# Patient Record
Sex: MALE | Race: WHITE | NOT HISPANIC OR LATINO | Employment: UNEMPLOYED | URBAN - METROPOLITAN AREA
[De-identification: names, ages, dates, MRNs, and addresses within clinical notes are randomized per-mention and may not be internally consistent; named-entity substitution may affect disease eponyms.]

---

## 2018-07-16 ENCOUNTER — OFFICE VISIT CONVERTED (OUTPATIENT)
Dept: NEUROSURGERY | Facility: CLINIC | Age: 38
End: 2018-07-16
Attending: PHYSICIAN ASSISTANT

## 2018-11-13 ENCOUNTER — OFFICE VISIT CONVERTED (OUTPATIENT)
Dept: NEUROSURGERY | Facility: CLINIC | Age: 38
End: 2018-11-13
Attending: PHYSICIAN ASSISTANT

## 2018-11-13 ENCOUNTER — CONVERSION ENCOUNTER (OUTPATIENT)
Dept: NEUROLOGY | Facility: CLINIC | Age: 38
End: 2018-11-13

## 2020-10-27 ENCOUNTER — HOSPITAL ENCOUNTER (OUTPATIENT)
Dept: URGENT CARE | Facility: CLINIC | Age: 40
Discharge: HOME | End: 2020-10-27
Attending: EMERGENCY MEDICINE

## 2020-11-11 ENCOUNTER — CONVERSION ENCOUNTER (OUTPATIENT)
Dept: FAMILY MEDICINE CLINIC | Facility: CLINIC | Age: 40
End: 2020-11-11

## 2020-11-11 ENCOUNTER — HOSPITAL ENCOUNTER (OUTPATIENT)
Dept: FAMILY MEDICINE CLINIC | Facility: CLINIC | Age: 40
Discharge: HOME | End: 2020-11-11
Attending: NURSE PRACTITIONER

## 2020-11-11 ENCOUNTER — OFFICE VISIT CONVERTED (OUTPATIENT)
Dept: FAMILY MEDICINE CLINIC | Facility: CLINIC | Age: 40
End: 2020-11-11
Attending: NURSE PRACTITIONER

## 2020-11-11 LAB
ALBUMIN SERPL-MCNC: 4.4 G/DL (ref 3.5–5)
ALBUMIN/GLOB SERPL: 1.3 {RATIO} (ref 1.4–2.6)
ALP SERPL-CCNC: 103 U/L (ref 53–128)
ALT SERPL-CCNC: 15 U/L (ref 10–40)
ANION GAP SERPL CALC-SCNC: 15 MMOL/L (ref 8–19)
AST SERPL-CCNC: 20 U/L (ref 15–50)
BASOPHILS # BLD AUTO: 0.07 10*3/UL (ref 0–0.2)
BASOPHILS NFR BLD AUTO: 0.5 % (ref 0–3)
BILIRUB SERPL-MCNC: 0.48 MG/DL (ref 0.2–1.3)
BUN SERPL-MCNC: 17 MG/DL (ref 5–25)
BUN/CREAT SERPL: 15 {RATIO} (ref 6–20)
CALCIUM SERPL-MCNC: 9.6 MG/DL (ref 8.7–10.4)
CHLORIDE SERPL-SCNC: 99 MMOL/L (ref 99–111)
CHOLEST SERPL-MCNC: 142 MG/DL (ref 107–200)
CHOLEST/HDLC SERPL: 3.5 {RATIO} (ref 3–6)
CONV ABS IMM GRAN: 0.08 10*3/UL (ref 0–0.2)
CONV CO2: 27 MMOL/L (ref 22–32)
CONV IMMATURE GRAN: 0.6 % (ref 0–1.8)
CONV TOTAL PROTEIN: 7.7 G/DL (ref 6.3–8.2)
CREAT UR-MCNC: 1.1 MG/DL (ref 0.7–1.2)
DEPRECATED RDW RBC AUTO: 40.8 FL (ref 35.1–43.9)
EOSINOPHIL # BLD AUTO: 0.23 10*3/UL (ref 0–0.7)
EOSINOPHIL # BLD AUTO: 1.8 % (ref 0–7)
ERYTHROCYTE [DISTWIDTH] IN BLOOD BY AUTOMATED COUNT: 11.9 % (ref 11.6–14.4)
GFR SERPLBLD BASED ON 1.73 SQ M-ARVRAT: >60 ML/MIN/{1.73_M2}
GLOBULIN UR ELPH-MCNC: 3.3 G/DL (ref 2–3.5)
GLUCOSE SERPL-MCNC: 89 MG/DL (ref 70–99)
HCT VFR BLD AUTO: 48.5 % (ref 42–52)
HDLC SERPL-MCNC: 41 MG/DL (ref 40–60)
HGB BLD-MCNC: 16.1 G/DL (ref 14–18)
LDLC SERPL CALC-MCNC: 81 MG/DL (ref 70–100)
LYMPHOCYTES # BLD AUTO: 2.79 10*3/UL (ref 1–5)
LYMPHOCYTES NFR BLD AUTO: 21.3 % (ref 20–45)
MCH RBC QN AUTO: 31 PG (ref 27–31)
MCHC RBC AUTO-ENTMCNC: 33.2 G/DL (ref 33–37)
MCV RBC AUTO: 93.3 FL (ref 80–96)
MONOCYTES # BLD AUTO: 0.92 10*3/UL (ref 0.2–1.2)
MONOCYTES NFR BLD AUTO: 7 % (ref 3–10)
NEUTROPHILS # BLD AUTO: 8.99 10*3/UL (ref 2–8)
NEUTROPHILS NFR BLD AUTO: 68.8 % (ref 30–85)
NRBC CBCN: 0 % (ref 0–0.7)
OSMOLALITY SERPL CALC.SUM OF ELEC: 283 MOSM/KG (ref 273–304)
PLATELET # BLD AUTO: 336 10*3/UL (ref 130–400)
PMV BLD AUTO: 9.2 FL (ref 9.4–12.4)
POTASSIUM SERPL-SCNC: 4.9 MMOL/L (ref 3.5–5.3)
RBC # BLD AUTO: 5.2 10*6/UL (ref 4.7–6.1)
SODIUM SERPL-SCNC: 136 MMOL/L (ref 135–147)
TRIGL SERPL-MCNC: 99 MG/DL (ref 40–150)
TSH SERPL-ACNC: 5.82 M[IU]/L (ref 0.27–4.2)
VLDLC SERPL-MCNC: 20 MG/DL (ref 5–37)
WBC # BLD AUTO: 13.08 10*3/UL (ref 4.8–10.8)

## 2020-12-02 ENCOUNTER — OFFICE VISIT CONVERTED (OUTPATIENT)
Dept: FAMILY MEDICINE CLINIC | Facility: CLINIC | Age: 40
End: 2020-12-02
Attending: NURSE PRACTITIONER

## 2020-12-02 ENCOUNTER — HOSPITAL ENCOUNTER (OUTPATIENT)
Dept: FAMILY MEDICINE CLINIC | Facility: CLINIC | Age: 40
Discharge: HOME | End: 2020-12-02
Attending: NURSE PRACTITIONER

## 2020-12-02 LAB
BASOPHILS # BLD AUTO: 0.11 10*3/UL (ref 0–0.2)
BASOPHILS NFR BLD AUTO: 0.8 % (ref 0–3)
CONV ABS IMM GRAN: 0.08 10*3/UL (ref 0–0.2)
CONV IMMATURE GRAN: 0.6 % (ref 0–1.8)
DEPRECATED RDW RBC AUTO: 39.3 FL (ref 35.1–43.9)
EOSINOPHIL # BLD AUTO: 0.32 10*3/UL (ref 0–0.7)
EOSINOPHIL # BLD AUTO: 2.2 % (ref 0–7)
ERYTHROCYTE [DISTWIDTH] IN BLOOD BY AUTOMATED COUNT: 11.9 % (ref 11.6–14.4)
HCT VFR BLD AUTO: 43.2 % (ref 42–52)
HGB BLD-MCNC: 14.5 G/DL (ref 14–18)
LYMPHOCYTES # BLD AUTO: 3.75 10*3/UL (ref 1–5)
LYMPHOCYTES NFR BLD AUTO: 26 % (ref 20–45)
MCH RBC QN AUTO: 30.4 PG (ref 27–31)
MCHC RBC AUTO-ENTMCNC: 33.6 G/DL (ref 33–37)
MCV RBC AUTO: 90.6 FL (ref 80–96)
MONOCYTES # BLD AUTO: 1.55 10*3/UL (ref 0.2–1.2)
MONOCYTES NFR BLD AUTO: 10.7 % (ref 3–10)
NEUTROPHILS # BLD AUTO: 8.64 10*3/UL (ref 2–8)
NEUTROPHILS NFR BLD AUTO: 59.7 % (ref 30–85)
NRBC CBCN: 0 % (ref 0–0.7)
PLATELET # BLD AUTO: 386 10*3/UL (ref 130–400)
PMV BLD AUTO: 9.1 FL (ref 9.4–12.4)
RBC # BLD AUTO: 4.77 10*6/UL (ref 4.7–6.1)
T4 FREE SERPL-MCNC: 1.2 NG/DL (ref 0.9–1.8)
TSH SERPL-ACNC: 8.41 M[IU]/L (ref 0.27–4.2)
WBC # BLD AUTO: 14.45 10*3/UL (ref 4.8–10.8)

## 2020-12-17 ENCOUNTER — HOSPITAL ENCOUNTER (OUTPATIENT)
Dept: CARDIOLOGY | Facility: HOSPITAL | Age: 40
Discharge: HOME | End: 2020-12-17
Attending: NURSE PRACTITIONER

## 2020-12-28 ENCOUNTER — HOSPITAL ENCOUNTER (OUTPATIENT)
Dept: GENERAL RADIOLOGY | Facility: HOSPITAL | Age: 40
Discharge: HOME | End: 2020-12-28
Attending: NURSE PRACTITIONER

## 2021-01-05 ENCOUNTER — OFFICE VISIT CONVERTED (OUTPATIENT)
Dept: FAMILY MEDICINE CLINIC | Facility: CLINIC | Age: 41
End: 2021-01-05
Attending: NURSE PRACTITIONER

## 2021-04-19 ENCOUNTER — CONVERSION ENCOUNTER (OUTPATIENT)
Dept: SURGERY | Facility: CLINIC | Age: 41
End: 2021-04-19

## 2021-04-19 ENCOUNTER — OFFICE VISIT CONVERTED (OUTPATIENT)
Dept: UROLOGY | Facility: CLINIC | Age: 41
End: 2021-04-19
Attending: UROLOGY

## 2021-04-19 LAB
BILIRUB UR QL STRIP: NEGATIVE
COLOR UR: YELLOW
CONV BACTERIA IN URINE MICRO: 0
CONV CALCIUM OXALATE CRYSTALS /HPF IN URINE SEDIMENT BY MICROSCOPY: 0
CONV CLARITY OF URINE: CLEAR
CONV PROTEIN IN URINE BY AUTOMATED TEST STRIP: NEGATIVE
CONV UROBILINOGEN IN URINE BY AUTOMATED TEST STRIP: 0.2
GLUCOSE UR QL: NEGATIVE
HGB UR QL STRIP: NEGATIVE
KETONES UR QL STRIP: NORMAL
LEUKOCYTE ESTERASE UR QL STRIP: NEGATIVE
NITRITE UR QL STRIP: NEGATIVE
PH UR STRIP.AUTO: 6.5 [PH]
RBC #/AREA URNS HPF: 0 /[HPF]
RENAL EPI CELLS #/AREA URNS HPF: 0 /[HPF]
SP GR UR: 1.03
SQUAMOUS SPT QL MICRO: 0
WBC #/AREA URNS HPF: 0 /[HPF]

## 2021-04-23 ENCOUNTER — HOSPITAL ENCOUNTER (OUTPATIENT)
Dept: MRI IMAGING | Facility: HOSPITAL | Age: 41
Discharge: HOME | End: 2021-04-23
Attending: UROLOGY

## 2021-04-26 ENCOUNTER — OFFICE VISIT CONVERTED (OUTPATIENT)
Dept: UROLOGY | Facility: CLINIC | Age: 41
End: 2021-04-26
Attending: UROLOGY

## 2021-04-26 ENCOUNTER — CONVERSION ENCOUNTER (OUTPATIENT)
Dept: SURGERY | Facility: CLINIC | Age: 41
End: 2021-04-26

## 2021-04-26 LAB
BILIRUB UR QL STRIP: NEGATIVE
COLOR UR: YELLOW
CONV BACTERIA IN URINE MICRO: 0
CONV CALCIUM OXALATE CRYSTALS /HPF IN URINE SEDIMENT BY MICROSCOPY: 0
CONV CLARITY OF URINE: CLEAR
CONV PROTEIN IN URINE BY AUTOMATED TEST STRIP: NEGATIVE
CONV UROBILINOGEN IN URINE BY AUTOMATED TEST STRIP: 0.2
GLUCOSE UR QL: NEGATIVE
HGB UR QL STRIP: NEGATIVE
KETONES UR QL STRIP: NEGATIVE
LEUKOCYTE ESTERASE UR QL STRIP: NEGATIVE
NITRITE UR QL STRIP: NEGATIVE
PH UR STRIP.AUTO: 5.5 [PH]
RBC #/AREA URNS HPF: 0 /[HPF]
RENAL EPI CELLS #/AREA URNS HPF: 0 /[HPF]
SP GR UR: >=1.03
SQUAMOUS SPT QL MICRO: 0
WBC #/AREA URNS HPF: 0 /[HPF]

## 2021-04-29 ENCOUNTER — HOSPITAL ENCOUNTER (OUTPATIENT)
Dept: PREADMISSION TESTING | Facility: HOSPITAL | Age: 41
Discharge: HOME | End: 2021-04-29
Attending: UROLOGY

## 2021-04-29 LAB
ALBUMIN SERPL-MCNC: 4.3 G/DL (ref 3.5–5)
ALBUMIN/GLOB SERPL: 1.3 {RATIO} (ref 1.4–2.6)
ALP SERPL-CCNC: 77 U/L (ref 53–128)
ALT SERPL-CCNC: 21 U/L (ref 10–40)
ANION GAP SERPL CALC-SCNC: 13 MMOL/L (ref 8–19)
AST SERPL-CCNC: 17 U/L (ref 15–50)
BASOPHILS # BLD AUTO: 0.08 10*3/UL (ref 0–0.2)
BASOPHILS NFR BLD AUTO: 0.7 % (ref 0–3)
BILIRUB SERPL-MCNC: <0.15 MG/DL (ref 0.2–1.3)
BUN SERPL-MCNC: 16 MG/DL (ref 5–25)
BUN/CREAT SERPL: 16 {RATIO} (ref 6–20)
CALCIUM SERPL-MCNC: 9.1 MG/DL (ref 8.7–10.4)
CHLORIDE SERPL-SCNC: 103 MMOL/L (ref 99–111)
CONV ABS IMM GRAN: 0.06 10*3/UL (ref 0–0.2)
CONV CO2: 28 MMOL/L (ref 22–32)
CONV IMMATURE GRAN: 0.5 % (ref 0–1.8)
CONV TOTAL PROTEIN: 7.5 G/DL (ref 6.3–8.2)
CREAT UR-MCNC: 1.03 MG/DL (ref 0.7–1.2)
DEPRECATED RDW RBC AUTO: 41 FL (ref 35.1–43.9)
EOSINOPHIL # BLD AUTO: 0.28 10*3/UL (ref 0–0.7)
EOSINOPHIL # BLD AUTO: 2.4 % (ref 0–7)
ERYTHROCYTE [DISTWIDTH] IN BLOOD BY AUTOMATED COUNT: 12.1 % (ref 11.6–14.4)
GFR SERPLBLD BASED ON 1.73 SQ M-ARVRAT: >60 ML/MIN/{1.73_M2}
GLOBULIN UR ELPH-MCNC: 3.2 G/DL (ref 2–3.5)
GLUCOSE SERPL-MCNC: 91 MG/DL (ref 70–99)
HCT VFR BLD AUTO: 42.3 % (ref 42–52)
HGB BLD-MCNC: 14.4 G/DL (ref 14–18)
INR PPP: 0.88 (ref 2–3)
LYMPHOCYTES # BLD AUTO: 3.1 10*3/UL (ref 1–5)
LYMPHOCYTES NFR BLD AUTO: 26.7 % (ref 20–45)
MCH RBC QN AUTO: 31.4 PG (ref 27–31)
MCHC RBC AUTO-ENTMCNC: 34 G/DL (ref 33–37)
MCV RBC AUTO: 92.4 FL (ref 80–96)
MONOCYTES # BLD AUTO: 0.87 10*3/UL (ref 0.2–1.2)
MONOCYTES NFR BLD AUTO: 7.5 % (ref 3–10)
NEUTROPHILS # BLD AUTO: 7.23 10*3/UL (ref 2–8)
NEUTROPHILS NFR BLD AUTO: 62.2 % (ref 30–85)
NRBC CBCN: 0 % (ref 0–0.7)
OSMOLALITY SERPL CALC.SUM OF ELEC: 291 MOSM/KG (ref 273–304)
PLATELET # BLD AUTO: 300 10*3/UL (ref 130–400)
PMV BLD AUTO: 8.7 FL (ref 9.4–12.4)
POTASSIUM SERPL-SCNC: 4.1 MMOL/L (ref 3.5–5.3)
PROTHROMBIN TIME: 9.9 S (ref 9.4–12)
RBC # BLD AUTO: 4.58 10*6/UL (ref 4.7–6.1)
SODIUM SERPL-SCNC: 140 MMOL/L (ref 135–147)
WBC # BLD AUTO: 11.62 10*3/UL (ref 4.8–10.8)

## 2021-04-30 LAB — SARS-COV-2 RNA SPEC QL NAA+PROBE: NOT DETECTED

## 2021-05-10 NOTE — H&P
History and Physical      Patient Name: Donte Stallings   Patient ID: [id number]   Sex: Male   YOB: 1980    Primary Care Provider: Nadia CANADA   Referring Provider: Nadia CANADA    Visit Date: November 11, 2020    Provider: NANCIE Bravo   [facility]   Location Address: [address] [place] [address], [address]  [place], [place]  [id number]   Location Phone: [phone]          Chief Complaint  · EST CARE      History Of Present Illness  Donte Stallings is a 40 year old /White male who presents to [facility] care:      Previous PCP was Alba Bradley. He has a history of bipolar lower, depression, anxiety, COPD, chronic low back pain including lumbar spinal stenosis, and hypertension.  He had recently went to the dentist and was told his blood pressure was 204/135.  He went to [facility] on 10/27/2020 and his blood pressure is 173/102 and 161/95.  He was started on lisinopril HCTZ 20-25 mg daily.  BP today in office 132/90.  He has had intermittent chest pain and palpitations over the past couple years.  Denies syncope.      He smokes 1 pack/day x 25 years.  Drinks alcohol socially and denies substance use.    He has a history of low back pain which he is seen pain management Dr. Lee neurosurgeon.  He had not surgery on his low back.  He states that his back felt better for approximately a year then pain became worse with numbness and tingling.  He has had physical therapy approximately 1 year ago.    He previously seen a psychiatrist Sherwin Pickens in [facility].  He has been on Prozac, fluoxetine, Effexor, Seroquel, Abilify, and Klonopin.  Medications initially helped but then he felt the medication stopped working in which he stopped taking the medication.  Denies any suicidal ideation.    COPD: He uses a rescue inhaler 3-4 times a week.       Past Medical History  Allergies; Anxiety; Asthma; Bipolar 1 disorder; Broken Bones; Chronic bronchiolitis;  Depression; Emphysema; Forgetfulness; Head injury; Heart Murmur; Hernia; High blood pressure; Lumbago/low back pain; Lumbar Spinal Stenosis; Psychiatric disorder; Shortness of Breath; Skin Disease         Past Surgical History  Back surgery; Hand surgery; Leg Surgery; Lumbar laminectomy         Medication List  lisinopril-hydrochlorothiazide oral         Allergy List  NO KNOWN DRUG ALLERGIES         Family Medical History  Diabetes, unspecified type; Heart Attack (MI)         Social History  Tobacco (Current every day)         Review of Systems  · Constitutional  o Denies  o : fatigue, fever, chills, night sweats  · Eyes  o Denies  o : double vision, blurred vision  · HENT  o Denies  o : headaches, vertigo, lightheadedness, recent head injury  · Cardiovascular  o Admits  o : chest pain, irregular heart beats, dyspnea on exertion  o Denies  o : rapid heart rate, lower extremity edema  · Respiratory  o Admits  o : shortness of breath, wheezing, cough  · Gastrointestinal  o Admits  o : constipation, hemorrhoids  o Denies  o : nausea, vomiting, diarrhea, reflux, abdominal pain, blood in stools  · Genitourinary  o Denies  o : urgency, frequency, dysuria, urinary retention  · Integument  o Denies  o : hair growth change, new skin lesions  · Neurologic  o Admits  o : tingling or numbness  o Denies  o : altered mental status, seizures, tremors  · Musculoskeletal  o Admits  o : joint pain, back pain  o Denies  o : joint swelling, limitation of motion  · Endocrine  o Denies  o : polyuria, polydipsia, cold intolerance, heat intolerance  · Psychiatric  o Admits  o : anxiety, depression  o Denies  o : hallucinations, delusions, feeling confused, difficulty sleeping, compulsive behaviors, impulsive behaviors, suicidal ideation  · Heme-Lymph  o Denies  o : petechiae, lymph node enlargement or tenderness  · Allergic-Immunologic  o Denies  o : frequent illnesses      Vitals  Date Time BP Position Site L\R Cuff Size HR RR TEMP (F) WT  " HT  BMI kg/m2 BSA m2 O2 Sat FR L/min FiO2 HC       11/11/2020 09:01 /90 Sitting    75 - R  96.8 187lbs 6oz 6'  2\" 24.06 2.11 97 %            Physical Examination  · Constitutional  o Appearance  o : alert, in no acute distress  · Head and Face  o Head  o :   § Inspection  § : atraumatic, normocephalic  o Face  o :   § Inspection  § : no facial lesions  o HEENT  o : Unremarkable  · Eyes  o Conjunctivae  o : conjunctivae normal  o Sclerae  o : sclerae white  o Pupils and Irises  o : pupils equal and round, pupils reactive to light bilaterally  o Eyelids/Ocular Adnexae  o : eyelid appearance normal  · Ears, Nose, Mouth and Throat  o Ears  o :   § External Ears  § : appearance within normal limits, no lesions present  o Nose  o :   § External Nose  § : appearance normal  o Oral Cavity  o :   § Oral Mucosa  § : oral mucosa normal  § Lips  § : lip appearance normal  § Teeth  § : normal dentition for age  § Gums  § : gums pink, non-swollen, no bleeding present  § Tongue  § : tongue appearance normal  § Palate  § : hard palate normal, soft palate appearance normal  o Throat  o : Left tonsil 3+ right tonsil 2+  · Neck  o Inspection/Palpation  o : normal appearance, no masses or tenderness, trachea midline  o Thyroid  o : gland size normal, nontender, no nodules or masses present on palpation  · Respiratory  o Respiratory Effort  o : breathing unlabored  o Auscultation of Lungs  o : normal breath sounds  · Cardiovascular  o Heart  o :   § Auscultation of Heart  § : regular rate, normal rhythm, no murmurs present  o Peripheral Vascular System  o :   § Extremities  § : no edema  · Gastrointestinal  o Abdominal Examination  o : abdomen nontender to palpation, normal bowel sounds, tone normal without rigidity or guarding, no masses present  o Liver and spleen  o : no hepatomegaly present  · Lymphatic  o Neck  o : no lymphadenopathy   o Supraclavicular Nodes  o : no supraclavicular nodes  · Psychiatric  o Mood and " Affect  o : mood normal, affect appropriate  o Presence of Abnormal Thoughts  o : no hallucinations, no delusions present, no psychotic thoughts, no homicidal ideation, no suicidal ideation, no evidence of obsessional thinking  · EKG  o EKG  o : Was performed in the office today  o Indications  o : Palpitations intermittent chest pain  o Results  o : Sinus rhythm, rate 68, , QRST 85, , QTc B4 137 Axis P 69, QRS 56, T 45  o Comparison  o : no comparison              Assessment  · Annual physical exam     V70.0/Z00.00  CBC, CMP, TSH  · COPD (chronic obstructive pulmonary disease)     496/J44.9  Start Breo Ellipta, chest x-ray  · Essential hypertension     401.9/I10  Continue lisinopril 20 HCTZ 25 mg daily  · Lumbago     724.2/M54.5  · Screening for depression     V79.0/Z13.89  Positive for depression  · Need for influenza vaccination     V04.81/Z23  Flu vaccine today  · Screening for lipid disorders     V77.91/Z13.220  Check lipid panel  · Bipolar 1 disorder     296.7/F31.9  Consult psychiatry Dr. Mariano  · Palpitations     785.1/R00.2  EKG in office      Plan  · Orders  o Smoking cessation counseling, 3-10 minutes [facility] (43860) - 496/J44.9 - 11/11/2020  o Psychiatric Consultation (PSYCH) - V79.0/Z13.89, 296.7/F31.9 - 11/11/2020  o Immunization Admin Fee (Single) ([facility]) (77119) - V04.81/Z23 - 11/11/2020  o Fluzone Quadrivalent Vaccine, age 6 months + (85363) - V04.81/Z23 - 11/11/2020   Vaccine - Influenza; Dose: 0.5; Site: Right Upper Arm; Route: Intramuscular; Date: 11/11/2020 12:46:00; Exp: 06/30/2021; Lot: [id number]; Mfg: sanofi pasteur; TradeName: Fluzone Quadrivalent; Administered By: Naz Pham CMA; Comment: pt tolerated inj well, left office stable  o Physical, Primary Care Panel (CBC, CMP, Lipid, TSH) [facility] (61146, 29705, 21375, 61109) - V70.0/Z00.00, 496/J44.9, 401.9/I10, V77.91/Z13.220 - 11/11/2020  o ACO-39: Current medications updated and reviewed (, 1159F) - - 11/11/2020  o ACO-18:  Positive screen for clinical depression using a standardized tool and a follow-up plan documented () - - 11/11/2020   24 POINTS  o ACO-14: Influenza immunization administered or previously received [facility] () - - 11/11/2020  o EKG (Recording and Interpretation) [facility] (Done and read at [facility]) (46410) - 785.1/R00.2 - 11/11/2020  o CXR PA/Lat [facility] (41268) - 496/J44.9 - 11/11/2020  · Medications  o Breo Ellipta 100-25 mcg/dose inhalation blister with device   SIG: inhale 1 puff by inhalation route once daily at the same time each day   DISP: (1) Inhaler with 2 refills  Prescribed on 11/11/2020     o lisinopril-hydrochlorothiazide 20-25 mg oral tablet   SIG: take 1 tablet by oral route once daily   DISP: (30) Tablet with 2 refills  Prescribed on 11/11/2020     o amoxicillin oral   SIG: ---   DISP: (0) Capsule with 0 refills  Discontinued on 11/11/2020     · Instructions  o Reviewed health maintenance flowsheet and updated information. Orders were placed and/or patient's response was documented.  o Depression Screen completed and scanned into the EMR under the designated folder within the patient's documents.  o Patient was educated/instructed on their diagnosis, treatment and medications prior to discharge from the clinic today.  o Patient instructed to seek medical attention urgently for new or worsening symptoms.  o Call the office with any concerns or questions.  · Disposition  o Call or Return if symptoms worsen or persist.  o f/u 2 weeks            Electronically Signed by: NANCIE Bravo -Author on November 11, 2020 01:42:51 PM

## 2021-05-11 NOTE — H&P
History and Physical      Patient Name: Donte Stallings   Patient ID: [id number]   Sex: Male   YOB: 1980    Primary Care Provider: Dawit CANADA   Referring Provider: Dawit CANADA    Visit Date: April 19, 2021    Provider: Marli Hamilton MD   Location: [facility]   Location Address: [address] [place] [address]  [id number]   Location Phone: [phone]          Chief Complaint  · urological issues      History Of Present Illness  The patient is a 41 year old /White male, who presents on referral from Dawit CANADA, for an evaluation of a renal mass. Recent diagnostic testing consisting of an abdominal CT with/without contrast reveal the mass. This was done in April 2021.   The patient denies abdominal pain, hematuria, dysuria, fever, weight loss and flank pain.   He has had no history of genitourinary cancers.   Current Diagnostic Testing:    There have been no intervening diagnostic urologic studies.      He also had a small left adrenal mass which is not completely characterized.  Unfortunately his right renal mass is completely encased inside the right kidney and is not amenable to a partial nephrectomy.    He will need an MRI to further evaluate the right renal mass but most especially the left adrenal mass which is possibly a metastatic deposit.  Hopefully the MRI will be able to better characterize the adrenal nodule.  I discussed all of this with the patient and his wife today at their appointment.       Past Medical History  Allergies; Anxiety; Asthma; Bipolar 1 disorder; Broken Bones; Depression; Emphysema; Head injury; Heart Murmur; Hernia; High blood pressure; Lumbago/low back pain; Lumbar Spinal Stenosis; Shortness of Breath; Skin Disease         Past Surgical History  Back surgery; Hand surgery; Leg Surgery; Lumbar laminectomy         Medication List  Breo Ellipta 100-25 mcg/dose inhalation blister with device; diclofenac sodium 75  "mg oral tablet,delayed release (DR/EC); levothyroxine 25 mcg oral tablet; lisinopril-hydrochlorothiazide 20-25 mg oral tablet; olanzapine 5 mg oral tablet         Allergy List  NO KNOWN DRUG ALLERGIES       Allergies Reconciled  Family Medical History  Diabetes, unspecified type; Heart Attack (MI)         Social History  Alcohol (Current some day); Tobacco (Current every day)         Immunizations  Name Date Admin   Influenza 11/11/2020         Review of Systems  · Constitutional  o Denies  o : fatigue, night sweats  · Eyes  o Denies  o : double vision, blurred vision  · HENT  o Denies  o : vertigo, recent head injury  · Breasts  o Denies  o : abnormal changes in breast size, additional breast symptoms except as noted in the HPI  · Cardiovascular  o Denies  o : chest pain, irregular heart beats  · Respiratory  o Denies  o : shortness of breath, productive cough  · Gastrointestinal  o Denies  o : nausea, vomiting  · Genitourinary  o Denies  o : urgency, frequency, dysuria, hematuria, urinary retention  · Integument  o Denies  o : hair growth change, new skin lesions  · Neurologic  o Denies  o : altered mental status, seizures  · Musculoskeletal  o Denies  o : joint swelling, limitation of motion  · Endocrine  o Denies  o : cold intolerance, heat intolerance  · Heme-Lymph  o Denies  o : petechiae, lymph node enlargement or tenderness  · Allergic-Immunologic  o Denies  o : frequent illnesses      Vitals  Date Time BP Position Site L\R Cuff Size HR RR TEMP (F) WT  HT  BMI kg/m2 BSA m2 O2 Sat FR L/min FiO2 HC       04/19/2021 03:57 /78 Sitting    84 - R   179lbs 0oz 6'  2\" 22.98 2.06             Physical Examination  · Constitutional  o Appearance  o : Well nourished, well developed patient in no acute distress. Ambulating without difficulty.  · Respiratory  o Respiratory Effort  o : Breathing is unlabored without accessory muscle use          Results  · In-Office Procedures  o Lab procedure  § Automated dipstick " urinalysis with microscopy (38589)   § Color Ur: Yellow   § Clarity Ur: Clear   § Glucose Ur Ql Strip: Negative   § Bilirub Ur Ql Strip: Negative   § Ketones Ur Ql Strip: Trace   § Sp Gr Ur Qn: 1.030   § Hgb Ur Ql Strip: Negative   § pH Ur-LsCnc: 6.5   § Prot Ur Ql Strip: Negative   § Urobilinogen Ur Strip-mCnc: 0.2   § Nitrite Ur Ql Strip: Negative   § WBC Est Ur Ql Strip: Negative   § RBC UrnS Qn HPF: 0   § WBC UrnS Qn HPF: 0   § Bacteria UrnS Qn HPF: 0   § Crystals UrnS Qn HPF: 0   § Epithelial Cells (non renal): 0 /HPF  § Epithelial Cells (renal): 0       Assessment  · Right Renal Mass     593.9/N28.89  · Left adrenal mass     255.8/E27.8      Plan  · Orders  o Mri Abdomen W/O&W Dye [[id number]] (75197) - 593.9/N28.89, 255.8/E27.8 - 04/19/2021   Adrenal and renal mass protocol  · Medications  o Medications have been Reconciled  o Transition of Care or Provider Policy  · Instructions  o The patient has an right renal mass and an indeterminate left adrenal mass. I have discussed my diagnosis with the patient including the options for treatment, benefits and risks. He will need an MRI to further characterize these lesions. I discussed this with patient and his wife.   o MDM: Includes management of 2 new problems with uncertain prognosis (right renal mass and left adrenal mass) with review of records from ED and CT scan and scheduling of MRI. [address]13  o Electronically Identified Patient Education Materials Provided Electronically            Electronically Signed by: Marli Hamilton MD -Author on April 19, 2021 05:56:44 PM

## 2021-05-13 NOTE — PROGRESS NOTES
Progress Note      Patient Name: Donte Stallings   Patient ID: [id number]   Sex: Male   YOB: 1980    Primary Care Provider: Dawit CANADA   Referring Provider: Dawit CANADA    Visit Date: December 2, 2020    Provider: NANCIE Bravo   Location: [facility]   Location Address: [address] [place] [address], [address] [place] [address]  [id number]   Location Phone: [phone]          Chief Complaint  · FOLLOW-UP      History Of Present Illness  Donte Stallings is a 40 year old /White male who presents for evaluation and treatment of:      Presents today for a 3-week follow-up on hypertension, COPD, nicotine dependence, low back pain, and bipolar.  Blood pressure today is 112/68.  Blood pressures well controlled.  He states he feels better not feeling vertigo and headaches.  Denies chest pain.  Continue feels palpitations.  He has been using the Breo Ellipta inhaler 4 times a week.  States he does not like the taste of the medicine.  Has not noticed a difference with the inhaler for his shortness of breath and cough.    He has not seen psychiatry for his bipolar.    He has chronic low back pain.  He has numbness and tingling across his low back.  He had an MRI in 2014 that showed severe left foraminal stenosis and posterior central disc herniation.  He had low back surgery in the last 5 years.  After the surgery he had felt better but the pain has returned and become worse over the past year.  He states he smokes marijuana to help out with his back pain.  After UDS reports he had taken his friend's pain medication.  UDS tested positive for THC, OXY, Amp, and Met.  He denies taking amphetamines and methamphetamine.    He smokes a pack a day       Past Medical History  Allergies; Anxiety; Asthma; Bipolar 1 disorder; Broken Bones; Depression; Emphysema; Head injury; Heart Murmur; Hernia; High blood pressure; Lumbago/low back pain; Lumbar Spinal Stenosis; Shortness of Breath;  "Skin Disease         Past Surgical History  Back surgery; Hand surgery; Leg Surgery; Lumbar laminectomy         Medication List  Breo Ellipta 100-25 mcg/dose inhalation blister with device; lisinopril-hydrochlorothiazide 20-25 mg oral tablet         Allergy List  NO KNOWN DRUG ALLERGIES         Family Medical History  Diabetes, unspecified type; Heart Attack (MI)         Social History  Tobacco (Current every day)         Immunizations  Name Date Admin   Influenza 11/11/2020         Review of Systems  · Constitutional  o Denies  o : fatigue, fever, chills  · Eyes  o Denies  o : blurred vision, changes in vision  · HENT  o Denies  o : headaches  · Cardiovascular  o Denies  o : lower extremity edema, claudication, chest pressure, palpitations  · Respiratory  o Admits  o : shortness of breath, cough  o Denies  o : wheezing, hemoptysis, dyspnea on exertion  · Gastrointestinal  o Denies  o : nausea, vomiting, diarrhea, constipation, abdominal pain  · Genitourinary  o Denies  o : frequency, dysuria, hematuria  · Neurologic  o Admits  o : tingling or numbness  o Denies  o : altered mental status, seizures, tremors  · Musculoskeletal  o Admits  o : muscle pain, back pain  o Denies  o : joint swelling  · Psychiatric  o Admits  o : anxiety, depression  o Denies  o : hallucinations, delusions, feeling confused, difficulty sleeping, compulsive behaviors, impulsive behaviors, suicidal ideation, homicidal ideation      Vitals  Date Time BP Position Site L\R Cuff Size HR RR TEMP (F) WT  HT  BMI kg/m2 BSA m2 O2 Sat FR L/min FiO2 HC       12/02/2020 09:42 /68 Sitting    83 - R  97.3 187lbs 8oz 6'  2\" 24.07 2.11 96 %            Physical Examination  · Constitutional  o Appearance  o : alert, in no acute distress  · Head and Face  o Head  o :   § Inspection  § : atraumatic, normocephalic  o Face  o :   § Inspection  § : no facial lesions  o HEENT  o : Unremarkable  · Eyes  o Conjunctivae  o : conjunctivae normal  o Sclerae  o : " sclerae white  o Pupils and Irises  o : pupils equal and round, pupils reactive to light bilaterally  o Eyelids/Ocular Adnexae  o : eyelid appearance normal  · Neck  o Inspection/Palpation  o : normal appearance, no masses or tenderness, trachea midline  o Thyroid  o : gland size normal, nontender, no nodules or masses present on palpation  · Respiratory  o Respiratory Effort  o : breathing unlabored  o Auscultation of Lungs  o : normal breath sounds  · Cardiovascular  o Heart  o :   § Auscultation of Heart  § : regular rate, normal rhythm, no murmurs present  o Peripheral Vascular System  o :   § Extremities  § : no edema  · Gastrointestinal  o Abdominal Examination  o : abdomen nontender to palpation, normal bowel sounds, tone normal without rigidity or guarding, no masses present  o Liver and spleen  o : no hepatomegaly present  · Lymphatic  o Neck  o : no lymphadenopathy           Results  · In-Office Procedures  o Lab procedure  § IOP - Urine Drug Screen In-House [facility] (25398)   § Amphetamines Ur Ql: Positive   § Barbiturates Ur Ql: Negative   § Buprenorphine+Nor Ur Ql Scn: Negative   § Benzodiaz Ur Ql: Negative   § Cocaine Ur Ql: Negative   § Methadone Ur Ql: Negative   § Methamphet Ur Ql: Positive   § MDMA Ur Ql Scn: Negative   § Opiates Ur Ql: Negative   § Oxycodone Ur Ql: Positive   § PCP Ur Ql: Negative   § THC Ur Ql: Positive   § Temp in Range?: Within/Acceptable   § Control Seen?: Yes       Assessment  · Essential hypertension     401.9/I10  · Lumbago     724.2/M54.5  · Nicotine dependence     305.1/F17.200  · Tobacco abuse counseling       Tobacco abuse counseling     V65.42/Z71.6  Discussed smoking cessation  · Palpitations     785.1/R00.2  Order Holter monitor  · Lumbar spinal stenosis     724.02/M48.061  Low back x-ray, prescribe diclofenac sodium 75 mg twice daily. UDS tested positive.  · DDD (degenerative disc disease),  lumbar     722.52/M51.36  · Paresthesia     782.0/R20.2  · Leukocytosis     288.60/D72.829  White blood count was elevated at 13. At that time he had a tooth infection. Repeat CBC  · Elevated TSH     794.5/R79.89  elevated TSH of 5.82, check thyroid profile      Plan  · Orders  o Lumbar Spine Complete H (19868) - 724.2/M54.5 - 12/02/2020  o Smoking cessation counseling, 3-10 minutes [facility] (27637) - 305.1/F17.200 - 12/02/2020  o ACO-17: Screened for tobacco use AND received tobacco cessation intervention (4004F) - 305.1/F17.200 - 12/02/2020  o ACO-17: Screened for tobacco use AND received tobacco cessation intervention (4004F) - V65.42/Z71.6 - 12/02/2020  o CBC with Auto Diff [facility] (05940) - 288.60/D72.829 - 12/02/2020  o Thyroid Profile (52219, 84796, THYII) - 794.5/R79.89 - 12/02/2020  o ACO-17: Screened for tobacco use AND received tobacco cessation intervention (4004F) - - 12/02/2020  o ACO-39: Current medications updated and reviewed (, 1159F) - - 12/02/2020  o Holter Monitor 24 Hour [facility] (36351) - 785.1/R00.2 - 12/02/2020  · Medications  o diclofenac sodium 75 mg oral tablet,delayed release (DR/EC)   SIG: take 1 tablet (75 mg) by oral route 2 times per day for 14 days   DISP: (28) Tablet with 2 refills  Prescribed on 12/02/2020     · Instructions  o Patient advised to monitor blood pressure (B/P) at home and journal readings. Patient informed that a B/P reading at home of more than 130/80 is considered hypertension. For readings greater xors331/90 or higher patient is advised to follow up in the office with readings for management. Patient advised to limit sodium intake.  o *Form of nicotine being used:   o Patient was strongly encouraged to discontinue use of any nicotine containing product or minimize the use of the product.  o Tobacco and smoking cessation counseling for more than 3 minutes was completed.  o Patient was educated/instructed on their diagnosis, treatment and medications prior to discharge  from the clinic today.  o Patient instructed to seek medical attention urgently for new or worsening symptoms.  o Call the office with any concerns or questions.  · Disposition  o Call or Return if symptoms worsen or persist.  o f/u 1 month            Electronically Signed by: NANCIE Bravo -Author on December 15, 2020 08:09:10 AM

## 2021-05-14 ENCOUNTER — HOSPITAL ENCOUNTER (OUTPATIENT)
Dept: FAMILY MEDICINE CLINIC | Facility: CLINIC | Age: 41
Discharge: HOME | End: 2021-05-14
Attending: NURSE PRACTITIONER

## 2021-05-14 ENCOUNTER — OFFICE VISIT CONVERTED (OUTPATIENT)
Dept: FAMILY MEDICINE CLINIC | Facility: CLINIC | Age: 41
End: 2021-05-14
Attending: NURSE PRACTITIONER

## 2021-05-14 VITALS
HEIGHT: 74 IN | OXYGEN SATURATION: 97 % | BODY MASS INDEX: 24.05 KG/M2 | HEART RATE: 75 BPM | DIASTOLIC BLOOD PRESSURE: 90 MMHG | TEMPERATURE: 96.8 F | WEIGHT: 187.37 LBS | SYSTOLIC BLOOD PRESSURE: 132 MMHG

## 2021-05-14 VITALS
SYSTOLIC BLOOD PRESSURE: 120 MMHG | WEIGHT: 179 LBS | BODY MASS INDEX: 22.97 KG/M2 | HEART RATE: 84 BPM | DIASTOLIC BLOOD PRESSURE: 78 MMHG | HEIGHT: 74 IN

## 2021-05-14 VITALS
DIASTOLIC BLOOD PRESSURE: 70 MMHG | OXYGEN SATURATION: 96 % | BODY MASS INDEX: 24.67 KG/M2 | WEIGHT: 192.25 LBS | HEIGHT: 74 IN | HEART RATE: 70 BPM | TEMPERATURE: 97.3 F | SYSTOLIC BLOOD PRESSURE: 124 MMHG

## 2021-05-14 VITALS
OXYGEN SATURATION: 96 % | WEIGHT: 187.5 LBS | DIASTOLIC BLOOD PRESSURE: 68 MMHG | HEART RATE: 83 BPM | HEIGHT: 74 IN | SYSTOLIC BLOOD PRESSURE: 112 MMHG | TEMPERATURE: 97.3 F | BODY MASS INDEX: 24.06 KG/M2

## 2021-05-14 VITALS
DIASTOLIC BLOOD PRESSURE: 61 MMHG | BODY MASS INDEX: 25.67 KG/M2 | SYSTOLIC BLOOD PRESSURE: 103 MMHG | WEIGHT: 200 LBS | HEIGHT: 74 IN

## 2021-05-14 LAB
ALBUMIN SERPL-MCNC: 4.6 G/DL (ref 3.5–5)
ALBUMIN/GLOB SERPL: 1.3 {RATIO} (ref 1.4–2.6)
ALP SERPL-CCNC: 82 U/L (ref 53–128)
ALT SERPL-CCNC: 18 U/L (ref 10–40)
ANION GAP SERPL CALC-SCNC: 14 MMOL/L (ref 8–19)
AST SERPL-CCNC: 18 U/L (ref 15–50)
BASOPHILS # BLD AUTO: 0.08 10*3/UL (ref 0–0.2)
BASOPHILS NFR BLD AUTO: 0.6 % (ref 0–3)
BILIRUB SERPL-MCNC: 0.24 MG/DL (ref 0.2–1.3)
BUN SERPL-MCNC: 31 MG/DL (ref 5–25)
BUN/CREAT SERPL: 17 {RATIO} (ref 6–20)
CALCIUM SERPL-MCNC: 9.6 MG/DL (ref 8.7–10.4)
CHLORIDE SERPL-SCNC: 101 MMOL/L (ref 99–111)
CONV ABS IMM GRAN: 0.11 10*3/UL (ref 0–0.2)
CONV CO2: 26 MMOL/L (ref 22–32)
CONV IMMATURE GRAN: 0.9 % (ref 0–1.8)
CONV TOTAL PROTEIN: 8.2 G/DL (ref 6.3–8.2)
CREAT UR-MCNC: 1.78 MG/DL (ref 0.7–1.2)
DEPRECATED RDW RBC AUTO: 41.2 FL (ref 35.1–43.9)
EOSINOPHIL # BLD AUTO: 0.31 10*3/UL (ref 0–0.7)
EOSINOPHIL # BLD AUTO: 2.5 % (ref 0–7)
ERYTHROCYTE [DISTWIDTH] IN BLOOD BY AUTOMATED COUNT: 11.9 % (ref 11.6–14.4)
GFR SERPLBLD BASED ON 1.73 SQ M-ARVRAT: 46 ML/MIN/{1.73_M2}
GLOBULIN UR ELPH-MCNC: 3.6 G/DL (ref 2–3.5)
GLUCOSE SERPL-MCNC: 90 MG/DL (ref 70–99)
HCT VFR BLD AUTO: 42.1 % (ref 42–52)
HGB BLD-MCNC: 13.7 G/DL (ref 14–18)
LYMPHOCYTES # BLD AUTO: 3.64 10*3/UL (ref 1–5)
LYMPHOCYTES NFR BLD AUTO: 29 % (ref 20–45)
MCH RBC QN AUTO: 30.7 PG (ref 27–31)
MCHC RBC AUTO-ENTMCNC: 32.5 G/DL (ref 33–37)
MCV RBC AUTO: 94.4 FL (ref 80–96)
MONOCYTES # BLD AUTO: 0.95 10*3/UL (ref 0.2–1.2)
MONOCYTES NFR BLD AUTO: 7.6 % (ref 3–10)
NEUTROPHILS # BLD AUTO: 7.45 10*3/UL (ref 2–8)
NEUTROPHILS NFR BLD AUTO: 59.4 % (ref 30–85)
NRBC CBCN: 0 % (ref 0–0.7)
OSMOLALITY SERPL CALC.SUM OF ELEC: 286 MOSM/KG (ref 273–304)
PLATELET # BLD AUTO: 431 10*3/UL (ref 130–400)
PMV BLD AUTO: 9.2 FL (ref 9.4–12.4)
POTASSIUM SERPL-SCNC: 5.5 MMOL/L (ref 3.5–5.3)
RBC # BLD AUTO: 4.46 10*6/UL (ref 4.7–6.1)
SODIUM SERPL-SCNC: 135 MMOL/L (ref 135–147)
T4 FREE SERPL-MCNC: 1.3 NG/DL (ref 0.9–1.8)
TSH SERPL-ACNC: 3.59 M[IU]/L (ref 0.27–4.2)
WBC # BLD AUTO: 12.54 10*3/UL (ref 4.8–10.8)

## 2021-05-14 NOTE — PROGRESS NOTES
Progress Note      Patient Name: Donte Stallings   Patient ID: [id number]   Sex: Male   YOB: 1980    Primary Care Provider: Dawit CANADA   Referring Provider: REID CANADA    Visit Date: April 26, 2021    Provider: Marli Hamilton MD   Location: [facility]   Location Address: [address] [place] [address]  [id number]   Location Phone: [phone]          Chief Complaint  · pt is here for urological concerns      History Of Present Illness  The patient is a 41 year old /White male , who presents for follow up from Dawit CANADA , for an evaluation of a renal mass. Recent diagnostic testing consisting of an abdominal CT with/without contrast reveal the mass. This was done in April 2021.   The patient denies abdominal pain, hematuria, dysuria, fever, weight loss and flank pain.   He has had no history of genitourinary cancers.   Current Diagnostic Testing:    Recently, diagnostic studies included Abdominal MRI. The abdominal MRI scan results show as below:.      He also had a small left adrenal mass which is not completely characterized with CT scan.  Unfortunately his right renal mass is completely encased inside the right kidney and is not amenable to a partial nephrectomy.    His MRI of the abdomen reveals a solid lesion in the right lower midpole that is not amenable to a partial nephrectomy.  There is a left adrenal mass that appears to be a benign lesion and will not need resection.  He will need follow up scans of that.       Past Medical History  Adrenal mass; Allergies; Anxiety; Asthma; Bipolar 1 disorder; Broken Bones; Depression; Emphysema; Head injury; Heart Murmur; Hernia; High blood pressure; Lumbago/low back pain; Lumbar Spinal Stenosis; Renal Mass; Shortness of Breath; Skin Disease         Past Surgical History  Back surgery; Hand surgery; Leg Surgery; Lumbar laminectomy         Medication List  Breo Ellipta 100-25 mcg/dose  "inhalation blister with device; DICLOFENAC SODIUM 75MG DR TABLETS; levothyroxine 25 mcg oral tablet; lisinopril-hydrochlorothiazide 20-25 mg oral tablet; olanzapine 5 mg oral tablet         Allergy List  NO KNOWN DRUG ALLERGIES       Allergies Reconciled  Family Medical History  Diabetes, unspecified type; Heart Attack (MI)         Social History  Alcohol (Current some day); Tobacco (Current every day)         Immunizations  Name Date Admin   Influenza 11/11/2020         Review of Systems  · Constitutional  o Denies  o : chills, fever  · Gastrointestinal  o Denies  o : nausea, vomiting      Vitals  Date Time BP Position Site L\R Cuff Size HR RR TEMP (F) WT  HT  BMI kg/m2 BSA m2 O2 Sat FR L/min FiO2 HC       04/26/2021 03:48 /61 Sitting       200lbs 0oz 6'  2\" 25.68 2.18             Physical Examination  · Constitutional  o Appearance  o : Well nourished, well developed patient in no acute distress. Ambulating without difficulty.  · Respiratory  o Respiratory Effort  o : Breathing is unlabored without accessory muscle use          Results  · In-Office Procedures  o Lab procedure  § Automated dipstick urinalysis with microscopy (55936)   § Color Ur: Yellow   § Clarity Ur: Clear   § Glucose Ur Ql Strip: Negative   § Bilirub Ur Ql Strip: Negative   § Ketones Ur Ql Strip: Negative   § Sp Gr Ur Qn: >=1.030   § Hgb Ur Ql Strip: Negative   § pH Ur-LsCnc: 5.5   § Prot Ur Ql Strip: Negative   § Urobilinogen Ur Strip-mCnc: 0.2   § Nitrite Ur Ql Strip: Negative   § WBC Est Ur Ql Strip: Negative   § RBC UrnS Qn HPF: 0   § WBC UrnS Qn HPF: 0   § Bacteria UrnS Qn HPF: 0   § Crystals UrnS Qn HPF: 0   § Epithelial Cells (non renal): 0 /HPF  § Epithelial Cells (renal): 0       Assessment  · Right Renal Mass     593.9/N28.89  · Left adrenal mass     255.8/E27.8      Plan  · Medications  o Medications have been Reconciled  o Transition of Care or Provider Policy  · Instructions  o The patient has an right solid renal mass that " will need removal and a likely benign left adrenal mass which will need follow up. I have discussed my diagnosis with the patient including the options for treatment, benefits and risks. We will proceed with a right lap radical nephrectomy. Risks and benefits discussed with patient who is agreeable to proceed.   o Schedule a laparascopic right radical nephrectomy  o MDM: Includes management of 2 new problems with uncertain prognosis (right renal mass and left adrenal mass) with review of MRI and scheduling of major elective surgery 25267  o Electronically Identified Patient Education Materials Provided Electronically            Electronically Signed by: Marli Hamilton MD -Author on April 26, 2021 04:06:03 PM

## 2021-05-14 NOTE — PROGRESS NOTES
Progress Note      Patient Name: Donte Stallings   Patient ID: [id number]   Sex: Male   YOB: 1980    Primary Care Provider: Dawit CANADA   Referring Provider: Dawit [place]NADA    Visit Date: January 5, 2021    Provider: NANCIE Bravo   Location: [facility]   Location Address: [address] [place] [address], [address] [place] [address]  [id number]   Location Phone: [phone]          Chief Complaint  · 1 month f/u      History Of Present Illness  Donte Stallings is a 40 year old /White male who presents for evaluation and treatment of:      Presents today for 1 month follow-up for abnormal labs, low back pain, hypertension, palpitations, and bipolar.  He had elevated white blood count for the past 2 months.  WBC on 11/11 was 13.08 and on 12/2 was 14.45.  He is rescheduled to see hematology next week.      He has subclinical hypothyroidism, free T4 1.2, TSH 8.4.    He recently had a Holter monitor for his palpitations.  Holter monitor showed sinus rhythm with heart rate between  bpm.  He had some isolated PVCs about 20 and 2 isolated premature supraventricular complexes.  No sign of A. fib or a flutter.  Denies chest pain and syncope.    Hypertension: Blood pressures well controlled today in office 124/70.    Low back pain, DDD; he states the diclofenac sodium did not help out with his back pain.  He noticed more relief with ibuprofen.  Gabapentin was not written due to his UDS being positive for amphetamines and methamphetamines.  He states he took a pill that his brother gave him that he thought was pain medication may have been laced with the other drugs.    History of bipolar and depression.  He is previously taking Prozac, fluoxetine, Effexor, Seroquel, Abilify, and Klonopin.  He stated Abilify had worked the best for his bipolar and depression.  After being on Abilify he reports the medication stopped working which he discontinued taking the medication.  Denies  suicidal ideation.       Past Medical History  Allergies; Anxiety; Asthma; Bipolar 1 disorder; Broken Bones; Depression; Emphysema; Head injury; Heart Murmur; Hernia; High blood pressure; Lumbago/low back pain; Lumbar Spinal Stenosis; Shortness of Breath; Skin Disease         Past Surgical History  Back surgery; Hand surgery; Leg Surgery; Lumbar laminectomy         Medication List  Breo Ellipta 100-25 mcg/dose inhalation blister with device; lisinopril-hydrochlorothiazide 20-25 mg oral tablet         Allergy List  NO KNOWN DRUG ALLERGIES         Family Medical History  Diabetes, unspecified type; Heart Attack (MI)         Social History  Tobacco (Current every day)         Immunizations  Name Date Admin   Influenza 11/11/2020         Review of Systems  · Constitutional  o Admits  o : fatigue  o Denies  o : fever, body aches, night sweats  · Eyes  o Denies  o : double vision, blurred vision, changes in vision  · HENT  o Denies  o : headaches, vertigo, lightheadedness, recent head injury  · Cardiovascular  o Admits  o : irregular heart beats  o Denies  o : chest pain, rapid heart rate, dyspnea on exertion, lower extremity edema  · Respiratory  o Denies  o : shortness of breath, productive cough  · Gastrointestinal  o Denies  o : nausea, vomiting, diarrhea, constipation, abdominal pain, blood in stools  · Genitourinary  o Denies  o : dysuria, urinary retention  · Integument  o Denies  o : hair growth change, new skin lesions  · Neurologic  o Admits  o : tingling or numbness  o Denies  o : altered mental status, seizures, tremors  · Musculoskeletal  o Admits  o : back pain  o Denies  o : joint swelling, limitation of motion  · Endocrine  o Denies  o : cold intolerance, heat intolerance  · Psychiatric  o Admits  o : anxiety, depression, difficulty sleeping  o Denies  o : hallucinations, delusions, feeling confused, compulsive behaviors, impulsive behaviors, suicidal ideation, homicidal  "ideation  · Heme-Lymph  o Denies  o : petechiae, lymph node enlargement or tenderness  · Allergic-Immunologic  o Denies  o : frequent illnesses      Vitals  Date Time BP Position Site L\R Cuff Size HR RR TEMP (F) WT  HT  BMI kg/m2 BSA m2 O2 Sat FR L/min FiO2 HC       01/05/2021 02:43 /70 Sitting    70 - R  97.3 192lbs 4oz 6'  2\" 24.68 2.13 96 %            Physical Examination  · Constitutional  o Appearance  o : alert, in no acute distress  · Head and Face  o Head  o :   § Inspection  § : atraumatic, normocephalic  o Face  o :   § Inspection  § : no facial lesions  o HEENT  o : Unremarkable  · Eyes  o Conjunctivae  o : conjunctivae normal  o Sclerae  o : sclerae white  o Pupils and Irises  o : pupils equal and round, pupils reactive to light bilaterally  o Eyelids/Ocular Adnexae  o : eyelid appearance normal  · Neck  o Inspection/Palpation  o : normal appearance, no masses or tenderness, trachea midline  o Thyroid  o : gland size normal, nontender, no nodules or masses present on palpation  · Respiratory  o Respiratory Effort  o : breathing unlabored  o Auscultation of Lungs  o : normal breath sounds  · Cardiovascular  o Heart  o :   § Auscultation of Heart  § : regular rate, normal rhythm, no murmurs present  o Peripheral Vascular System  o :   § Extremities  § : no edema  · Gastrointestinal  o Abdominal Examination  o : abdomen nontender to palpation, normal bowel sounds, tone normal without rigidity or guarding, no masses present  o Liver and spleen  o : no hepatomegaly present  · Lymphatic  o Neck  o : no lymphadenopathy   o Supraclavicular Nodes  o : no supraclavicular nodes  · Psychiatric  o Mood and Affect  o : mood normal, affect appropriate  o Presence of Abnormal Thoughts  o : no hallucinations, no delusions present, no psychotic thoughts, no homicidal ideation, no suicidal ideation, no evidence of obsessional thinking          Assessment  · Essential hypertension     401.9/I10  Blood pressures well " controlled. Continue lisinopril HCTZ 20-25 mg daily  · Nicotine dependence     305.1/F17.200  He has decreased his smoking to less than 1 pack/day  · Tobacco abuse counseling       Tobacco abuse counseling     V65.42/Z71.6  · Subclinical hypothyroidism     244.8/E03.9  Start levothyroxine 25 mcg daily  · Bipolar 1 disorder     296.7/F31.9  Start olanzapine 5 mg daily  · Leukocytosis     288.60/D72.829  He will see hematology oncology next Monday  · Marijuana abuse     305.20/F12.10      Plan  · Orders  o ACO-17: Screened for tobacco use AND received tobacco cessation intervention (4004F) - 305.1/F17.200 - 01/05/2021  o ACO-17: Screened for tobacco use AND received tobacco cessation intervention (4004F) - V65.42/Z71.6 - 01/05/2021  o ACO-39: Current medications updated and reviewed (, 1159F) - - 01/05/2021  · Medications  o levothyroxine 25 mcg oral tablet   SIG: take 1 tablet (25 mcg) by oral route once daily on an empty stomach 30 minutes before breakfast for 30 days   DISP: (30) Tablet with 2 refills  Prescribed on 01/05/2021     o olanzapine 5 mg oral tablet   SIG: take 1 tablet (5 mg) by oral route once daily in the evening for 30 days   DISP: (30) Tablet with 2 refills  Prescribed on 01/05/2021     o diclofenac sodium 75 mg oral tablet,delayed release (DR/EC)   SIG: take 1 tablet (75 mg) by oral route 2 times per day for 14 days   DISP: (28) Tablet with 2 refills  Discontinued on 01/05/2021     · Instructions  o Patient advised to monitor blood pressure (B/P) at home and journal readings. Patient informed that a B/P reading at home of more than 130/80 is considered hypertension. For readings greater bjbf432/90 or higher patient is advised to follow up in the office with readings for management. Patient advised to limit sodium intake.  o Patient was strongly encouraged to discontinue use of any nicotine containing product or minimize the use of the product.  o Tobacco and smoking cessation counseling for  more than 3 minutes was completed.  o Patient was educated/instructed on their diagnosis, treatment and medications prior to discharge from the clinic today.  o Patient instructed to seek medical attention urgently for new or worsening symptoms.  o Call the office with any concerns or questions.  · Disposition  o Call or Return if symptoms worsen or persist.  o f/u 1 month            Electronically Signed by: NANCIE Bravo -Author on January 5, 2021 05:49:34 PM

## 2021-05-14 NOTE — PROGRESS NOTES
"   Progress Note      Patient Name: Donte Stallings   Patient ID: [id number]   Sex: Male   YOB: 1980    Primary Care Provider: Dawit CANADA   Referring Provider: REID CANADA    Visit Date: April 26, 2021    Provider: Marli Hamilton MD   Location: [facility]   Location Address: [address] [place] [address]  [id number]   Location Phone: [phone]          Chief Complaint  · History & Physical / Surgical Orders      History Of Present Illness  [facility]  Inpatient History and Physical Surgical Orders    Preadmission Location: [facility] Preadmission Time: 12:30 PM   Which Facility: [facility] Surgery Date: 05/04/2021 Surgery Time: 10:00 AM Preadmission Testing Date: 04/29/2021   Patient's Name: Donte Stallings YOB: 1980   Chief complaint/history present illness: Right Renal Mass   Current Medication List: Breo Ellipta 100-25 mcg/dose inhalation blister with device, DICLOFENAC SODIUM 75MG DR TABLETS, levothyroxine 25 mcg oral tablet, lisinopril-hydrochlorothiazide 20-25 mg oral tablet, and olanzapine 5 mg oral tablet   Allergies: NO KNOWN DRUG ALLERGIES   Significant past medical: Adrenal mass, Allergies, Anxiety, Asthma, Bipolar 1 disorder, Broken Bones, Depression, Emphysema, Head injury, Heart Murmur, Hernia, High blood pressure, Lumbago/low back pain, Lumbar Spinal Stenosis, Renal Mass, Shortness of Breath, and Skin Disease   Past Surgical History: Back surgery, Hand surgery, Leg Surgery, and Lumbar laminectomy   Examination of heart and lungs: Regular rate, rhythm, no murmur, gallop, rub, Breath sounds normal, no distress, and Abdomen soft, non-tender, BSx4 are positive         Allergy List    Allergies Reconciled  Vitals  Date Time BP Position Site L\R Cuff Size HR RR TEMP (F) WT  HT  BMI kg/m2 BSA m2 O2 Sat FR L/min FiO2 HC       04/26/2021 03:48 /61 Sitting       200lbs 0oz 6'  2\" 25.68 2.18   "               Assessment  · Encounter for preoperative examination for general surgical procedure     V72.84/Z01.818  · Renal Mass     593.9/N28.89      Plan  · Orders  o General Urology Surgery Order ([facility]) - V72.84/Z01.818, 593.9/N28.89 - 05/04/2021  o EKG (Recording only) (19102) - V72.84/Z01.818, 593.9/N28.89 - 04/29/2021  o [facility] Pre-Op Covid-19 Screening (33124) - V72.84/Z01.818, 593.9/N28.89 - 04/29/2021  o CBC (96097) - V72.84/Z01.818, 593.9/N28.89 - 04/29/2021  o CMP (15581) - V72.84/Z01.818, 593.9/N28.89 - 04/29/2021  o PT with INR (56010) - V72.84/Z01.818, 593.9/N28.89 - 04/29/2021  o Type and Screen per unit (86518) - V72.84/Z01.818, 593.9/N28.89 - 04/29/2021  o CXR (PA and Lateral) (39437) - V72.84/Z01.818, 593.9/N28.89 - 04/29/2021  · Medications  o Medications have been Reconciled  o Transition of Care or Provider Policy  · Instructions  o *****Surgical Orders******  o Pre-Operative Orders: Sign permit for Laparoscopic Hand-Assisted Right Radical Nephrectomy  o ****Patient Status****  o Admit for INPATIENT services; Anticipated length of stay greater than two midnights  o ********************  o IV Fluids: LR @ 100 cc/hour  o Apply Thromboembolic Device (HUNG) hose Pre-Operative  o Sequential Compression Devices (SCD's) Intra-Operative  o Teach Use of Incentive Spirometer Pre Operative.  o IV Antibiotics (on call to OR):  o Levaquin 500 mg IV OCTOR.  o RISK AND BENEFITS:  o Possible risks/complications, benefits and alternatives to surgical or invasive procedure have been explained to patient and/or legal guardian.  o Electronically Identified Patient Education Materials Provided Electronically            Electronically Signed by: Shell Narayanan, -Author on April 26, 2021 04:39:36 PM  Electronically Co-signed by: Marli Hamilton MD -Reviewer on April 26, 2021 04:54:13 PM

## 2021-05-15 LAB
EST. AVERAGE GLUCOSE BLD GHB EST-MCNC: 108 MG/DL
HBA1C MFR BLD: 5.4 % (ref 3.5–5.7)

## 2021-05-16 VITALS — HEIGHT: 74 IN | HEART RATE: 74 BPM | BODY MASS INDEX: 23.87 KG/M2 | WEIGHT: 186 LBS

## 2021-05-16 VITALS — HEIGHT: 74 IN | BODY MASS INDEX: 24.77 KG/M2 | HEART RATE: 78 BPM | WEIGHT: 193 LBS

## 2021-05-17 ENCOUNTER — CONVERSION ENCOUNTER (OUTPATIENT)
Dept: SURGERY | Facility: CLINIC | Age: 41
End: 2021-05-17

## 2021-05-17 ENCOUNTER — OFFICE VISIT CONVERTED (OUTPATIENT)
Dept: UROLOGY | Facility: CLINIC | Age: 41
End: 2021-05-17
Attending: UROLOGY

## 2021-05-17 LAB
BILIRUB UR QL STRIP: NEGATIVE
COLOR UR: YELLOW
CONV BACTERIA IN URINE MICRO: 0
CONV CALCIUM OXALATE CRYSTALS /HPF IN URINE SEDIMENT BY MICROSCOPY: 0
CONV CLARITY OF URINE: CLEAR
CONV PROTEIN IN URINE BY AUTOMATED TEST STRIP: NEGATIVE
CONV UROBILINOGEN IN URINE BY AUTOMATED TEST STRIP: 0.2
GLUCOSE UR QL: NEGATIVE
HGB UR QL STRIP: NEGATIVE
KETONES UR QL STRIP: NEGATIVE
LEUKOCYTE ESTERASE UR QL STRIP: NEGATIVE
NITRITE UR QL STRIP: NEGATIVE
PH UR STRIP.AUTO: 6 [PH]
RBC #/AREA URNS HPF: 0 /[HPF]
RENAL EPI CELLS #/AREA URNS HPF: 0 /[HPF]
SP GR UR: 1.02
SQUAMOUS SPT QL MICRO: 0
WBC #/AREA URNS HPF: 0 /[HPF]

## 2021-06-05 NOTE — PROGRESS NOTES
Progress Note      Patient Name: Donte Stallings   Patient ID: [id number]   Sex: Male   YOB: 1980    Primary Care Provider: Dawit CANADA   Referring Provider: Dawit CANADA    Visit Date: May 14, 2021    Provider: NANCIE Bravo   Location: [facility]   Location Address: [address] [place] [address], [address]  [id number]   Location Phone: [phone]          Chief Complaint  · Follow up office visit within 14 calendar days of discharge from inpatient status (moderate complexity).      History Of Present Illness  Donte Stallings is a 41 year old /White male who presents for evaluation and treatment of:   FOLLOW UP OFFICE VISIT WITHIN 14 CALENDAR DAYS OF INPATIENT STATUS (MODERATE COMPLEXITY)  Donte Stallings presents to office for follow up post discharge from inpatient status within 14 calendar days. Patient was contacted within 2 business days via phone conversation. Documentation of that phone contact is present in the patient's electronic chart. Patient was admitted to inpatient facility on 5/4/2021 and discharged 5/5/2021 due to: renal mass.   Admitting MD: MEENU FRANKLIN MD   His discharge summary has been reviewed and placed in the patient's electronic chart.   Patient's problem list is: Adrenal mass, Allergies, Anxiety, Asthma, Bipolar 1 disorder, Broken Bones, Depression, Emphysema, Head injury, Heart Murmur, Hernia, High blood pressure, Lumbago/low back pain, Lumbar Spinal Stenosis, Renal mass, Shortness of Breath, and Skin Disease   Patient's outpatient medication list has been reconcilled with the medication list from the discharge summary and has been reviewed with the patient. Current medication list is: Breo Ellipta 100-25 mcg/dose inhalation blister with device, LEVOTHYROXINE 0.025MG (25MCG) TAB, levothyroxine 25 mcg oral tablet, lisinopril-hydrochlorothiazide 20-25 mg oral tablet, olanzapine 5 mg oral tablet, and  oxycodone-acetaminophen 5-325 mg oral tablet      Recently had a right nephrectomy due to renal mass.  Pathology report indicates renal cell carcinoma.  Overall he is doing well since surgery.  Denies fever and chills.  He had a couple days of constipation which has been taking Dulcolax and MiraLAX.  Stools are now soft.  Mild abdominal pain.  Denies dysuria urinary frequency and urinary urgency.       Past Medical History  Adrenal mass; Allergies; Anxiety; Asthma; Bipolar 1 disorder; Broken Bones; Depression; Emphysema; Head injury; Heart Murmur; Hernia; High blood pressure; Lumbago/low back pain; Lumbar Spinal Stenosis; Renal mass; Shortness of Breath; Skin Disease         Past Surgical History  Back surgery; Hand surgery; Leg Surgery; Lumbar laminectomy; Nephrectomy, right kidney         Medication List  Breo Ellipta 100-25 mcg/dose inhalation blister with device; levothyroxine 25 mcg oral tablet; LEVOTHYROXINE 0.025MG (25MCG) TAB; lisinopril-hydrochlorothiazide 20-25 mg oral tablet; olanzapine 5 mg oral tablet; oxycodone-acetaminophen 5-325 mg oral tablet         Allergy List  NO KNOWN DRUG ALLERGIES         Family Medical History  Diabetes, unspecified type; Heart Attack (MI)         Social History  Alcohol (Current some day); Tobacco (Current every day)         Immunizations  Name Date Admin   Influenza 11/11/2020         Review of Systems  · Constitutional  o Denies  o : fatigue, night sweats  · Eyes  o Denies  o : double vision, blurred vision  · HENT  o Denies  o : vertigo, recent head injury  · Cardiovascular  o Denies  o : chest pain, irregular heart beats  · Respiratory  o Denies  o : shortness of breath, productive cough  · Gastrointestinal  o Admits  o : constipation, abdominal pain  o Denies  o : nausea, vomiting, diarrhea  · Genitourinary  o Denies  o : dysuria, urinary retention  · Integument  o Denies  o : hair growth change, new skin lesions  · Neurologic  o Denies  o : altered mental status,  "seizures  · Musculoskeletal  o Denies  o : joint swelling, limitation of motion  · Endocrine  o Denies  o : cold intolerance, heat intolerance  · Heme-Lymph  o Denies  o : petechiae, lymph node enlargement or tenderness  · Allergic-Immunologic  o Denies  o : frequent illnesses      Vitals  Date Time BP Position Site L\R Cuff Size HR RR TEMP (F) WT  HT  BMI kg/m2 BSA m2 O2 Sat FR L/min FiO2        05/14/2021 01:36 /68 Sitting    84 - R  98.1 193lbs 6oz 6'  2\" 24.83 2.14 98 %            Physical Examination  · Constitutional  o Appearance  o : alert, in no acute distress  · Head and Face  o Head  o :   § Inspection  § : atraumatic, normocephalic  o Face  o :   § Inspection  § : no facial lesions  o HEENT  o : Unremarkable  · Eyes  o Conjunctivae  o : conjunctivae normal  o Sclerae  o : sclerae white  o Pupils and Irises  o : pupils equal and round, pupils reactive to light bilaterally  o Eyelids/Ocular Adnexae  o : eyelid appearance normal  · Ears, Nose, Mouth and Throat  o Ears  o :   § External Ears  § : appearance within normal limits, no lesions present  o Nose  o :   § External Nose  § : appearance normal  o Oral Cavity  o :   § Oral Mucosa  § : oral mucosa normal  § Lips  § : lip appearance normal  § Teeth  § : normal dentition for age  § Gums  § : gums pink, non-swollen, no bleeding present  § Tongue  § : tongue appearance normal  § Palate  § : hard palate normal, soft palate appearance normal  · Neck  o Inspection/Palpation  o : normal appearance, no masses or tenderness, trachea midline  o Thyroid  o : gland size normal, nontender, no nodules or masses present on palpation  · Respiratory  o Respiratory Effort  o : breathing unlabored  o Auscultation of Lungs  o : normal breath sounds  · Cardiovascular  o Heart  o :   § Auscultation of Heart  § : regular rate, normal rhythm, no murmurs present  o Peripheral Vascular System  o :   § Extremities  § : no edema  · Gastrointestinal  o Abdominal " Examination  o : abdomen nontender to palpation, normal bowel sounds, tone normal without rigidity or guarding, no masses present, abdomen scaphoid upon supine, , Incision midline staples intact no erythema. Also incision in the left upper quadrant and right and lower quadrant.  o Liver and spleen  o : no hepatomegaly present  · Lymphatic  o Neck  o : no lymphadenopathy           Assessment  · Hypothyroidism     244.9/E03.9  · Renal cell carcinoma     189.0/C64.9  He has a follow-up appointment with Dr. Rothman. Consult nephrology for Post right nephrectomy.  · Elevated blood sugar     790.29/R73.9  Check hemoglobin A1c.      Plan  · Orders  o CBC with Auto Diff HM (25718) - 189.0/C64.9 - 05/14/2021  o CMP HM (59988) - 189.0/C64.9, 244.9/E03.9 - 05/14/2021  o Hgb A1c [facility] (14814) - 790.29/R73.9 - 05/14/2021  o Thyroid Profile (THYII, 86415, 65406) - 244.9/E03.9 - 05/14/2021  o Discharge medications reconciled with the current medication list (1111F) - - 05/14/2021  o ACO-39: Current medications updated and reviewed (1159F, ) - - 05/14/2021  o NEPHROLOGY CONSULTATION (NEPHR) - 189.0/C64.9 - 05/14/2021   nephrectomy  · Instructions  o Patient was educated/instructed on their diagnosis, treatment and medications prior to discharge from the clinic today.  o Patient instructed to seek medical attention urgently for new or worsening symptoms.  o Call the office with any concerns or questions.  o Patient discharge summary has been reviewed and placed in patient's electronic medical record.  o Patient received a phone call from my office within 2 business days of discharge from inpatient status, and documented within the patient's chart.  o Also patient was seen (face to face) for follow up evaluation within 14 calendar days of discharge from inpatient status for a severe complexity issue.  o Patient was educated on their diagnosis, treatment and any medication changes while being evaluated  today.  · Disposition  o Call or Return if symptoms worsen or persist.  o f/u 3 months            Electronically Signed by: NANCIE Bravo -Author on May 14, 2021 02:18:33 PM

## 2021-06-05 NOTE — PROGRESS NOTES
Progress Note      Patient Name: Donte Stallings   Patient ID: [id number]   Sex: Male   YOB: 1980    Primary Care Provider: Dawit CANADA   Referring Provider: Dawit CANADA    Visit Date: May 17, 2021    Provider: Marli Hamilton MD   Location: [facility]   Location Address: [address] [place] [address]  [id number]   Location Phone: [phone]          Chief Complaint  · pt is here for urological concerns      History Of Present Illness  The patient returns for a routine cancer follow-up visit, as a 41 year old /White male, who underwent right laparoscopic radical nephrectomy for a well-differentiated adenocarcinoma, diagnosed on 05/04/2021, with no clinical evidence of recurrence.   He states since last visit there has been no hematuria, abdominal pain, weight loss, pulmonary symptoms.   Since surgery, follow-up diagnostic testing has not been performed.      He recently had a BMP and his creatinine was 1.78.      I removed his staples today in the office.       Past Medical History  Adrenal mass; Allergies; Anxiety; Asthma; Bipolar 1 disorder; Broken Bones; Depression; Emphysema; Head injury; Heart Murmur; Hernia; High blood pressure; Lumbago/low back pain; Lumbar Spinal Stenosis; Renal mass; Shortness of Breath; Skin Disease         Past Surgical History  Back surgery; Hand surgery; Leg Surgery; Lumbar laminectomy; Nephrectomy, right kidney         Medication List  Breo Ellipta 100-25 mcg/dose inhalation blister with device; LEVOTHYROXINE 0.025MG (25MCG) TAB; lisinopril-hydrochlorothiazide 20-25 mg oral tablet; olanzapine 5 mg oral tablet; oxycodone-acetaminophen 5-325 mg oral tablet         Allergy List  NO KNOWN DRUG ALLERGIES         Family Medical History  Diabetes, unspecified type; Heart Attack (MI)         Social History  Alcohol (Current some day); Tobacco (Current every day)         Immunizations  Name Date Admin   Influenza 11/11/2020  "        Review of Systems  · Constitutional  o Denies  o : chills, fever  · Gastrointestinal  o Denies  o : nausea, vomiting      Vitals  Date Time BP Position Site L\R Cuff Size HR RR TEMP (F) WT  HT  BMI kg/m2 BSA m2 O2 Sat FR L/min FiO2 HC       05/17/2021 03:03 /54 Sitting       193lbs 16oz 6'  2\" 24.91 2.14             Physical Examination  · Constitutional  o Appearance  o : Well nourished, well developed patient in no acute distress. Ambulating without difficulty.  · Respiratory  o Respiratory Effort  o : breathing unlabored  · Gastrointestinal  o Abdominal Examination  o : abdomen nontender to palpation, tone normal without rigidity or guarding, no masses present, incision healed, abdominal contour scaphoid          Results  · In-Office Procedures  o Lab procedure  § Automated dipstick urinalysis with microscopy (98181)   § Color Ur: Yellow   § Clarity Ur: Clear   § Glucose Ur Ql Strip: Negative   § Bilirub Ur Ql Strip: Negative   § Ketones Ur Ql Strip: Negative   § Sp Gr Ur Qn: 1.020   § Hgb Ur Ql Strip: Negative   § pH Ur-LsCnc: 6.0   § Prot Ur Ql Strip: Negative   § Urobilinogen Ur Strip-mCnc: 0.2   § Nitrite Ur Ql Strip: Negative   § WBC Est Ur Ql Strip: Negative   § RBC UrnS Qn HPF: 0   § WBC UrnS Qn HPF: 0   § Bacteria UrnS Qn HPF: 0   § Crystals UrnS Qn HPF: 0   § Epithelial Cells (non renal): 0 /HPF  § Epithelial Cells (renal): 0       Assessment  · Renal Cancer     189.0/C64.9      Plan  · Medications  o oxycodone-acetaminophen 5-325 mg oral tablet   SIG: take 1 tablet by oral route every 4 hours as needed   DISP: (40) Tablet with 0 refills  Refilled on 05/17/2021     o Medications have been Reconciled  o Transition of Care or Provider Policy  · Instructions  o Schedule abdominal CT scan in 6 months. I will see him then. Staples removed today.   o FOLLOW-UP:  o In 6 months  o Electronically Identified Patient Education Materials Provided Electronically            Electronically Signed by: " Marli Hamilton MD -Author on May 17, 2021 03:58:58 PM

## 2021-06-08 RX ORDER — LEVOTHYROXINE SODIUM 0.03 MG/1
TABLET ORAL
Qty: 90 TABLET | Refills: 1 | Status: SHIPPED | OUTPATIENT
Start: 2021-06-08 | End: 2021-12-09

## 2021-07-15 VITALS
HEART RATE: 84 BPM | HEIGHT: 74 IN | TEMPERATURE: 98.1 F | WEIGHT: 193.37 LBS | BODY MASS INDEX: 24.82 KG/M2 | DIASTOLIC BLOOD PRESSURE: 68 MMHG | SYSTOLIC BLOOD PRESSURE: 108 MMHG | OXYGEN SATURATION: 98 %

## 2021-07-15 VITALS
SYSTOLIC BLOOD PRESSURE: 114 MMHG | BODY MASS INDEX: 24.9 KG/M2 | HEIGHT: 74 IN | WEIGHT: 194 LBS | DIASTOLIC BLOOD PRESSURE: 54 MMHG

## 2021-07-22 RX ORDER — DICLOFENAC SODIUM 75 MG/1
TABLET, DELAYED RELEASE ORAL
Qty: 60 TABLET | Refills: 1 | Status: SHIPPED | OUTPATIENT
Start: 2021-07-22 | End: 2021-11-24 | Stop reason: SDUPTHER

## 2021-07-22 RX ORDER — DICLOFENAC SODIUM 75 MG/1
TABLET, DELAYED RELEASE ORAL
COMMUNITY
Start: 2021-05-25 | End: 2021-07-22 | Stop reason: SDUPTHER

## 2021-08-16 RX ORDER — OLANZAPINE 5 MG/1
5 TABLET ORAL DAILY
COMMUNITY
Start: 2021-05-30 | End: 2021-09-13

## 2021-08-16 RX ORDER — HYDROCODONE BITARTRATE AND ACETAMINOPHEN 5; 325 MG/1; MG/1
1 TABLET ORAL EVERY 6 HOURS PRN
COMMUNITY
Start: 2021-05-27 | End: 2021-08-17

## 2021-08-16 RX ORDER — LISINOPRIL AND HYDROCHLOROTHIAZIDE 25; 20 MG/1; MG/1
1 TABLET ORAL DAILY
COMMUNITY
Start: 2021-05-25 | End: 2022-04-30

## 2021-08-17 ENCOUNTER — OFFICE VISIT (OUTPATIENT)
Dept: FAMILY MEDICINE CLINIC | Facility: CLINIC | Age: 41
End: 2021-08-17

## 2021-08-17 VITALS
HEIGHT: 74 IN | OXYGEN SATURATION: 99 % | SYSTOLIC BLOOD PRESSURE: 146 MMHG | WEIGHT: 192.6 LBS | BODY MASS INDEX: 24.72 KG/M2 | DIASTOLIC BLOOD PRESSURE: 84 MMHG | HEART RATE: 90 BPM | TEMPERATURE: 98.3 F

## 2021-08-17 DIAGNOSIS — I10 ESSENTIAL HYPERTENSION: Primary | ICD-10-CM

## 2021-08-17 DIAGNOSIS — E03.9 HYPOTHYROIDISM, UNSPECIFIED TYPE: ICD-10-CM

## 2021-08-17 DIAGNOSIS — R42 DIZZINESS: ICD-10-CM

## 2021-08-17 DIAGNOSIS — F31.9 BIPOLAR 1 DISORDER (HCC): ICD-10-CM

## 2021-08-17 PROBLEM — E27.8 ADRENAL MASS (HCC): Status: ACTIVE | Noted: 2021-08-17

## 2021-08-17 PROBLEM — R01.1 HEART MURMUR: Status: ACTIVE | Noted: 2021-08-17

## 2021-08-17 PROBLEM — C64.9 RENAL CANCER (HCC): Status: ACTIVE | Noted: 2021-05-17

## 2021-08-17 PROBLEM — F41.9 ANXIETY: Status: ACTIVE | Noted: 2021-08-17

## 2021-08-17 PROBLEM — J43.9 PULMONARY EMPHYSEMA: Status: ACTIVE | Noted: 2021-08-17

## 2021-08-17 PROBLEM — F32.A DEPRESSION: Status: ACTIVE | Noted: 2021-08-17

## 2021-08-17 PROBLEM — J01.80 OTHER ACUTE SINUSITIS: Status: ACTIVE | Noted: 2021-08-17

## 2021-08-17 PROBLEM — N28.89 RENAL MASS: Status: RESOLVED | Noted: 2021-08-17 | Resolved: 2021-08-17

## 2021-08-17 PROBLEM — N28.89 RENAL MASS: Status: ACTIVE | Noted: 2021-08-17

## 2021-08-17 LAB
ALBUMIN SERPL-MCNC: 5 G/DL (ref 3.5–5.2)
ALBUMIN/GLOB SERPL: 1.5 G/DL
ALP SERPL-CCNC: 89 U/L (ref 39–117)
ALT SERPL W P-5'-P-CCNC: 16 U/L (ref 1–41)
ANION GAP SERPL CALCULATED.3IONS-SCNC: 11.8 MMOL/L (ref 5–15)
AST SERPL-CCNC: 19 U/L (ref 1–40)
BASOPHILS # BLD AUTO: 0.08 10*3/MM3 (ref 0–0.2)
BASOPHILS NFR BLD AUTO: 0.7 % (ref 0–1.5)
BILIRUB SERPL-MCNC: 0.4 MG/DL (ref 0–1.2)
BILIRUB UR QL STRIP: NEGATIVE
BUN SERPL-MCNC: 16 MG/DL (ref 6–20)
BUN/CREAT SERPL: 11.3 (ref 7–25)
CALCIUM SPEC-SCNC: 9.9 MG/DL (ref 8.6–10.5)
CHLORIDE SERPL-SCNC: 103 MMOL/L (ref 98–107)
CLARITY UR: ABNORMAL
CO2 SERPL-SCNC: 22.2 MMOL/L (ref 22–29)
COLOR UR: YELLOW
CREAT SERPL-MCNC: 1.42 MG/DL (ref 0.76–1.27)
DEPRECATED RDW RBC AUTO: 50.5 FL (ref 37–54)
EOSINOPHIL # BLD AUTO: 0.25 10*3/MM3 (ref 0–0.4)
EOSINOPHIL NFR BLD AUTO: 2.1 % (ref 0.3–6.2)
ERYTHROCYTE [DISTWIDTH] IN BLOOD BY AUTOMATED COUNT: 14.2 % (ref 12.3–15.4)
GFR SERPL CREATININE-BSD FRML MDRD: 55 ML/MIN/1.73
GLOBULIN UR ELPH-MCNC: 3.3 GM/DL
GLUCOSE SERPL-MCNC: 83 MG/DL (ref 65–99)
GLUCOSE UR STRIP-MCNC: NEGATIVE MG/DL
HCT VFR BLD AUTO: 45.1 % (ref 37.5–51)
HGB BLD-MCNC: 14.5 G/DL (ref 13–17.7)
HGB UR QL STRIP.AUTO: NEGATIVE
IMM GRANULOCYTES # BLD AUTO: 0.12 10*3/MM3 (ref 0–0.05)
IMM GRANULOCYTES NFR BLD AUTO: 1 % (ref 0–0.5)
KETONES UR QL STRIP: NEGATIVE
LEUKOCYTE ESTERASE UR QL STRIP.AUTO: NEGATIVE
LYMPHOCYTES # BLD AUTO: 1.07 10*3/MM3 (ref 0.7–3.1)
LYMPHOCYTES NFR BLD AUTO: 9 % (ref 19.6–45.3)
MCH RBC QN AUTO: 31.3 PG (ref 26.6–33)
MCHC RBC AUTO-ENTMCNC: 32.2 G/DL (ref 31.5–35.7)
MCV RBC AUTO: 97.4 FL (ref 79–97)
MONOCYTES # BLD AUTO: 1.19 10*3/MM3 (ref 0.1–0.9)
MONOCYTES NFR BLD AUTO: 10 % (ref 5–12)
NEUTROPHILS NFR BLD AUTO: 77.2 % (ref 42.7–76)
NEUTROPHILS NFR BLD AUTO: 9.14 10*3/MM3 (ref 1.7–7)
NITRITE UR QL STRIP: NEGATIVE
NRBC BLD AUTO-RTO: 0 /100 WBC (ref 0–0.2)
PH UR STRIP.AUTO: 5.5 [PH] (ref 5–8)
PLATELET # BLD AUTO: 315 10*3/MM3 (ref 140–450)
PMV BLD AUTO: 9.7 FL (ref 6–12)
POTASSIUM SERPL-SCNC: 4.7 MMOL/L (ref 3.5–5.2)
PROT SERPL-MCNC: 8.3 G/DL (ref 6–8.5)
PROT UR QL STRIP: ABNORMAL
RBC # BLD AUTO: 4.63 10*6/MM3 (ref 4.14–5.8)
SODIUM SERPL-SCNC: 137 MMOL/L (ref 136–145)
SP GR UR STRIP: 1.02 (ref 1–1.03)
T4 FREE SERPL-MCNC: 1.21 NG/DL (ref 0.93–1.7)
TSH SERPL DL<=0.05 MIU/L-ACNC: 7.77 UIU/ML (ref 0.27–4.2)
UROBILINOGEN UR QL STRIP: ABNORMAL
WBC # BLD AUTO: 11.85 10*3/MM3 (ref 3.4–10.8)

## 2021-08-17 PROCEDURE — 81003 URINALYSIS AUTO W/O SCOPE: CPT | Performed by: NURSE PRACTITIONER

## 2021-08-17 PROCEDURE — 99214 OFFICE O/P EST MOD 30 MIN: CPT | Performed by: NURSE PRACTITIONER

## 2021-08-17 PROCEDURE — 80050 GENERAL HEALTH PANEL: CPT | Performed by: NURSE PRACTITIONER

## 2021-08-17 PROCEDURE — 84439 ASSAY OF FREE THYROXINE: CPT | Performed by: NURSE PRACTITIONER

## 2021-08-17 RX ORDER — AMLODIPINE BESYLATE 5 MG/1
5 TABLET ORAL DAILY
Qty: 30 TABLET | Refills: 2 | Status: SHIPPED | OUTPATIENT
Start: 2021-08-17 | End: 2021-11-24 | Stop reason: SDUPTHER

## 2021-08-17 NOTE — PROGRESS NOTES
"Chief Complaint  Follow-up and Hypertension    Subjective          Donte Stallings presents to [facility]  Hypertension  This is a chronic problem. The current episode started more than 1 year ago. Associated symptoms include shortness of breath. Pertinent negatives include no chest pain, palpitations or sweats. Risk factors for coronary artery disease include male gender and smoking/tobacco exposure. Past treatments include ACE inhibitors and diuretics. Treatments tried: stopped lisinopril HTCZ. The current treatment provides mild improvement. Identifiable causes of hypertension include chronic renal disease and a thyroid problem.   Dizziness  This is a new problem. The current episode started more than 1 month ago. The problem occurs 2 to 4 times per day. The problem has been gradually worsening. Associated symptoms include a visual change. Pertinent negatives include no chest pain, fever, urinary symptoms or vertigo. Exacerbated by: activity. He has tried rest and lying down for the symptoms. The treatment provided mild relief.     Orthostatic blood pressures performed in office.  Lying down 146/82 heart rate 92 sat 97%, sitting 150/84, heart rate 95, 98%, standing 154/84, heart rate 99, sat 99%.  Patient reports dizziness from sitting to standing.  Social History     Socioeconomic History   • Marital status:      Spouse name: Not on file   • Number of children: Not on file   • Years of education: Not on file   • Highest education level: Not on file   Tobacco Use   • Smoking status: Current Every Day Smoker     Packs/day: 1.00     Years: 25.00     Pack years: 25.00   • Smokeless tobacco: Never Used   Substance and Sexual Activity   • Alcohol use: Yes     Comment: Occasionally        Objective   Vital Signs:   /84   Pulse 90   Temp 98.3 °F (36.8 °C)   Ht 188 cm (74\")   Wt 87.4 kg (192 lb 9.6 oz)   SpO2 99%   BMI 24.73 kg/m²     Physical Exam  Vitals reviewed. "   Constitutional:       Appearance: Normal appearance. He is well-developed.   HENT:      Head: Normocephalic and atraumatic.      Right Ear: External ear normal.      Left Ear: External ear normal.      Mouth/Throat:      Pharynx: No oropharyngeal exudate.   Eyes:      Conjunctiva/sclera: Conjunctivae normal.      Pupils: Pupils are equal, round, and reactive to light.   Cardiovascular:      Rate and Rhythm: Normal rate and regular rhythm.      Heart sounds: No murmur heard.   No friction rub. No gallop.    Pulmonary:      Effort: Pulmonary effort is normal.      Breath sounds: Normal breath sounds. No wheezing or rhonchi.   Abdominal:      General: Bowel sounds are normal. There is no distension.      Palpations: Abdomen is soft.      Tenderness: There is no abdominal tenderness.   Skin:     General: Skin is warm and dry.   Neurological:      Mental Status: He is alert and oriented to person, place, and time.      Cranial Nerves: No cranial nerve deficit.   Psychiatric:         Mood and Affect: Mood and affect normal.         Behavior: Behavior normal.         Thought Content: Thought content normal.         Judgment: Judgment normal.        Result Review :                 Assessment and Plan    Diagnoses and all orders for this visit:    1. Essential hypertension (Primary)  Assessment & Plan:  Switch lisinopril hydrochlorothiazide to amlodipine 5 mg daily.    Orders:  -     CBC & Differential  -     Comprehensive Metabolic Panel  -     Urinalysis With Culture If Indicated - Urine, Clean Catch  -     amLODIPine (NORVASC) 5 MG tablet; Take 1 tablet by mouth Daily.  Dispense: 30 tablet; Refill: 2    2. Hypothyroidism, unspecified type  Assessment & Plan:  Resume hypothyroid levothyroxine medication.  Check TSH free T4    Orders:  -     T4, free  -     TSH    3. Dizziness    4. Bipolar 1 disorder (CMS/[facility])  Assessment & Plan:  Resume Zyprexa medication.        Follow Up   Return in about 2 weeks (around 8/31/2021)  for Next scheduled follow up.  Patient was given instructions and counseling regarding his condition or for health maintenance advice. Please see specific information pulled into the AVS if appropriate.

## 2021-08-23 DIAGNOSIS — C64.1 MALIGNANT NEOPLASM OF RIGHT KIDNEY (HCC): Primary | ICD-10-CM

## 2021-09-13 RX ORDER — OLANZAPINE 5 MG/1
TABLET ORAL
Qty: 30 TABLET | Refills: 0 | Status: SHIPPED | OUTPATIENT
Start: 2021-09-13 | End: 2021-11-24 | Stop reason: SDUPTHER

## 2021-11-23 ENCOUNTER — HOSPITAL ENCOUNTER (OUTPATIENT)
Dept: CT IMAGING | Facility: HOSPITAL | Age: 41
Discharge: HOME | End: 2021-11-23
Admitting: UROLOGY

## 2021-11-23 DIAGNOSIS — C64.1 MALIGNANT NEOPLASM OF RIGHT KIDNEY (HCC): ICD-10-CM

## 2021-11-23 LAB — CREAT BLDA-MCNC: 1.4 MG/DL

## 2021-11-23 PROCEDURE — 74178 CT ABD&PLV WO CNTR FLWD CNTR: CPT

## 2021-11-23 PROCEDURE — 82565 ASSAY OF CREATININE: CPT

## 2021-11-23 PROCEDURE — 0 IOPAMIDOL PER 1 ML: Performed by: UROLOGY

## 2021-11-23 RX ADMIN — IOPAMIDOL 100 ML: 755 INJECTION, SOLUTION INTRAVENOUS at 08:45

## 2021-11-24 ENCOUNTER — OFFICE VISIT (OUTPATIENT)
Dept: UROLOGY | Facility: CLINIC | Age: 41
End: 2021-11-24

## 2021-11-24 VITALS
SYSTOLIC BLOOD PRESSURE: 156 MMHG | DIASTOLIC BLOOD PRESSURE: 92 MMHG | WEIGHT: 188.8 LBS | HEIGHT: 74 IN | BODY MASS INDEX: 24.23 KG/M2

## 2021-11-24 DIAGNOSIS — C64.9 MALIGNANT NEOPLASM OF KIDNEY, UNSPECIFIED LATERALITY (HCC): Primary | ICD-10-CM

## 2021-11-24 DIAGNOSIS — D35.00 ADRENAL ADENOMA, UNSPECIFIED LATERALITY: ICD-10-CM

## 2021-11-24 LAB
BILIRUB BLD-MCNC: NEGATIVE MG/DL
CLARITY, POC: CLEAR
COLOR UR: YELLOW
EXPIRATION DATE: NORMAL
GLUCOSE UR STRIP-MCNC: NEGATIVE MG/DL
KETONES UR QL: NEGATIVE
LEUKOCYTE EST, POC: NEGATIVE
Lab: NORMAL
NITRITE UR-MCNC: NEGATIVE MG/ML
PH UR: 6 [PH] (ref 5–8)
PROT UR STRIP-MCNC: NEGATIVE MG/DL
RBC # UR STRIP: NEGATIVE /UL
SP GR UR: 1.02 (ref 1–1.03)
UROBILINOGEN UR QL: NORMAL

## 2021-11-24 PROCEDURE — 99212 OFFICE O/P EST SF 10 MIN: CPT | Performed by: UROLOGY

## 2021-11-24 NOTE — PROGRESS NOTES
"Chief Complaint  Follow-up (6 Month F/U CT)    Subjective          Donte Stallings presents to [facility]  History of Present Illness  The patient presents today for a follow-up. Mr. Stallings had a right laparoscopic radical nephrectomy for renal cell carcinoma in 05/2021. A recent CT scan in 08/2021 revealed postsurgical changes of a right nephrectomy, no findings of recurrent malignancy. He does have a stable 2.3 cm left adrenal adenoma.    The patient reports feeling well today. He denies any current issues. He reports experiencing a sharp pain the other day. He states that this resolved on its own. His most recent labs revealed a creatinine of 1.4 and a GFR of 56. The patient has an appointment with nephrology in 02/2022. He has inquiries regarding whether or not he should receive the COVID-19 vaccine.     The patient reports back pain. He has seen Dr. Tristen Lee in the past and would like to make another appointment with his office for the same.       Objective   Vital Signs:   /92   Ht 188 cm (74\")   Wt 85.6 kg (188 lb 12.8 oz)   BMI 24.24 kg/m²     Physical Exam  Vitals and nursing note reviewed.   Constitutional:       Appearance: Normal appearance. He is well-developed.   Pulmonary:      Effort: Pulmonary effort is normal.      Breath sounds: Normal air entry.   Neurological:      Mental Status: He is alert and oriented to person, place, and time.      Motor: Motor function is intact.   Psychiatric:         Mood and Affect: Mood normal.         Behavior: Behavior normal.        Result Review :                  Results for orders placed or performed in visit on 11/24/21   POC Urinalysis Dipstick, Automated    Specimen: Urine   Result Value Ref Range    Color Yellow Yellow, Straw, Dark Yellow, Reyna    Clarity, UA Clear Clear    Specific Gravity  1.020 1.005 - 1.030    pH, Urine 6.0 5.0 - 8.0    Leukocytes Negative Negative    Nitrite, UA Negative Negative    Protein, POC " Negative Negative mg/dL    Glucose, UA Negative Negative, 1000 mg/dL (3+) mg/dL    Ketones, UA Negative Negative    Urobilinogen, UA Normal Normal    Bilirubin Negative Negative    Blood, UA Negative Negative    Lot Number [id number]     Expiration Date 11/30/2022        Assessment and Plan    Diagnoses and all orders for this visit:    1. Malignant neoplasm of kidney, unspecified laterality (HCC) (Primary)  -     POC Urinalysis Dipstick, Automated  -     CT Abdomen With & Without Contrast; Future  - The patient will follow up in 1 year with CT scan prior that will image both his adrenal adenoma and his kidneys.    2. Adrenal adenoma, unspecified laterality  -     CT Abdomen With & Without Contrast; Future        Follow Up   Return in about 1 year (around 11/24/2022) for CT scan prior.  Patient was given instructions and counseling regarding his condition or for health maintenance advice. Please see specific information pulled into the AVS if appropriate.     Transcribed from ambient dictation for Marli Hamilton MD by Donna Dinero  11/24/21   17:46 EST    Patient verbalized consent to the visit recording.  I have personally performed the services described in this document as transcribed by the above individual, and it is both accurate and complete.  Marli Hamilton MD  11/29/2021  13:19 EST

## 2021-11-25 DIAGNOSIS — I10 ESSENTIAL HYPERTENSION: ICD-10-CM

## 2021-11-29 RX ORDER — AMLODIPINE BESYLATE 5 MG/1
5 TABLET ORAL DAILY
Qty: 90 TABLET | Refills: 1 | Status: SHIPPED | OUTPATIENT
Start: 2021-11-29 | End: 2022-06-07 | Stop reason: SDUPTHER

## 2021-12-09 ENCOUNTER — HOSPITAL ENCOUNTER (EMERGENCY)
Facility: HOSPITAL | Age: 41
Discharge: LEFT BEFORE DISCHARGE | End: 2021-12-09

## 2021-12-09 VITALS
HEIGHT: 74 IN | RESPIRATION RATE: 16 BRPM | TEMPERATURE: 98.4 F | SYSTOLIC BLOOD PRESSURE: 121 MMHG | DIASTOLIC BLOOD PRESSURE: 78 MMHG | WEIGHT: 185.63 LBS | HEART RATE: 92 BPM | BODY MASS INDEX: 23.82 KG/M2 | OXYGEN SATURATION: 97 %

## 2021-12-09 LAB
ALBUMIN SERPL-MCNC: 4.4 G/DL (ref 3.5–5.2)
ALBUMIN/GLOB SERPL: 1.6 G/DL
ALP SERPL-CCNC: 103 U/L (ref 39–117)
ALT SERPL W P-5'-P-CCNC: 17 U/L (ref 1–41)
ANION GAP SERPL CALCULATED.3IONS-SCNC: 10.1 MMOL/L (ref 5–15)
AST SERPL-CCNC: 15 U/L (ref 1–40)
BASOPHILS # BLD AUTO: 0.06 10*3/MM3 (ref 0–0.2)
BASOPHILS NFR BLD AUTO: 0.4 % (ref 0–1.5)
BILIRUB SERPL-MCNC: 0.3 MG/DL (ref 0–1.2)
BILIRUB UR QL STRIP: NEGATIVE
BUN SERPL-MCNC: 13 MG/DL (ref 6–20)
BUN/CREAT SERPL: 10.3 (ref 7–25)
CALCIUM SPEC-SCNC: 9.4 MG/DL (ref 8.6–10.5)
CHLORIDE SERPL-SCNC: 106 MMOL/L (ref 98–107)
CLARITY UR: CLEAR
CO2 SERPL-SCNC: 22.9 MMOL/L (ref 22–29)
COLOR UR: YELLOW
CREAT SERPL-MCNC: 1.26 MG/DL (ref 0.76–1.27)
DEPRECATED RDW RBC AUTO: 38.8 FL (ref 37–54)
EOSINOPHIL # BLD AUTO: 0.34 10*3/MM3 (ref 0–0.4)
EOSINOPHIL NFR BLD AUTO: 2 % (ref 0.3–6.2)
ERYTHROCYTE [DISTWIDTH] IN BLOOD BY AUTOMATED COUNT: 12.1 % (ref 12.3–15.4)
GFR SERPL CREATININE-BSD FRML MDRD: 63 ML/MIN/1.73
GLOBULIN UR ELPH-MCNC: 2.8 GM/DL
GLUCOSE SERPL-MCNC: 99 MG/DL (ref 65–99)
GLUCOSE UR STRIP-MCNC: NEGATIVE MG/DL
HCT VFR BLD AUTO: 42.3 % (ref 37.5–51)
HGB BLD-MCNC: 15.1 G/DL (ref 13–17.7)
HGB UR QL STRIP.AUTO: NEGATIVE
HOLD SPECIMEN: NORMAL
HOLD SPECIMEN: NORMAL
IMM GRANULOCYTES # BLD AUTO: 0.07 10*3/MM3 (ref 0–0.05)
IMM GRANULOCYTES NFR BLD AUTO: 0.4 % (ref 0–0.5)
KETONES UR QL STRIP: NEGATIVE
LEUKOCYTE ESTERASE UR QL STRIP.AUTO: NEGATIVE
LYMPHOCYTES # BLD AUTO: 2.81 10*3/MM3 (ref 0.7–3.1)
LYMPHOCYTES NFR BLD AUTO: 16.7 % (ref 19.6–45.3)
MCH RBC QN AUTO: 31.4 PG (ref 26.6–33)
MCHC RBC AUTO-ENTMCNC: 35.7 G/DL (ref 31.5–35.7)
MCV RBC AUTO: 87.9 FL (ref 79–97)
MONOCYTES # BLD AUTO: 1.38 10*3/MM3 (ref 0.1–0.9)
MONOCYTES NFR BLD AUTO: 8.2 % (ref 5–12)
NEUTROPHILS NFR BLD AUTO: 12.16 10*3/MM3 (ref 1.7–7)
NEUTROPHILS NFR BLD AUTO: 72.3 % (ref 42.7–76)
NITRITE UR QL STRIP: NEGATIVE
NRBC BLD AUTO-RTO: 0 /100 WBC (ref 0–0.2)
PH UR STRIP.AUTO: 6 [PH] (ref 5–8)
PLATELET # BLD AUTO: 274 10*3/MM3 (ref 140–450)
PMV BLD AUTO: 8.5 FL (ref 6–12)
POTASSIUM SERPL-SCNC: 4.2 MMOL/L (ref 3.5–5.2)
PROT SERPL-MCNC: 7.2 G/DL (ref 6–8.5)
PROT UR QL STRIP: NEGATIVE
RBC # BLD AUTO: 4.81 10*6/MM3 (ref 4.14–5.8)
SODIUM SERPL-SCNC: 139 MMOL/L (ref 136–145)
SP GR UR STRIP: 1.02 (ref 1–1.03)
UROBILINOGEN UR QL STRIP: NORMAL
WBC NRBC COR # BLD: 16.82 10*3/MM3 (ref 3.4–10.8)
WHOLE BLOOD HOLD SPECIMEN: NORMAL
WHOLE BLOOD HOLD SPECIMEN: NORMAL

## 2021-12-09 PROCEDURE — 36415 COLL VENOUS BLD VENIPUNCTURE: CPT

## 2021-12-09 PROCEDURE — 81003 URINALYSIS AUTO W/O SCOPE: CPT

## 2021-12-09 PROCEDURE — 85025 COMPLETE CBC W/AUTO DIFF WBC: CPT

## 2021-12-09 PROCEDURE — 80053 COMPREHEN METABOLIC PANEL: CPT

## 2021-12-09 PROCEDURE — 99211 OFF/OP EST MAY X REQ PHY/QHP: CPT

## 2021-12-09 RX ORDER — LEVOTHYROXINE SODIUM 0.03 MG/1
TABLET ORAL
Qty: 90 TABLET | Refills: 1 | Status: SHIPPED | OUTPATIENT
Start: 2021-12-09 | End: 2022-06-07 | Stop reason: SDUPTHER

## 2021-12-14 ENCOUNTER — TELEPHONE (OUTPATIENT)
Dept: FAMILY MEDICINE CLINIC | Facility: CLINIC | Age: 41
End: 2021-12-14

## 2021-12-14 NOTE — TELEPHONE ENCOUNTER
PATIENT MISSED APPOINTMENT ON 12/13/2021 @ 9961 WITH DILCIA RAMON. CALLED NUMBER IN CHART, [phone]- PT STATED THAT WHEN HE GOT THE TEXT ASKING HIM TO CONFIRM HIS APPOINTMENT AND HE TEXTED N, FOR NO. HE STATED THAT HE HADN'T REALIZED IT STILL WENT THROUGH. DID NOT WANT TO RESCHEDULE AT THIS TIME

## 2022-04-25 ENCOUNTER — LAB (OUTPATIENT)
Dept: LAB | Facility: HOSPITAL | Age: 42
End: 2022-04-25

## 2022-04-25 ENCOUNTER — TRANSCRIBE ORDERS (OUTPATIENT)
Dept: LAB | Facility: HOSPITAL | Age: 42
End: 2022-04-25

## 2022-04-25 DIAGNOSIS — N28.9 RENAL INSUFFICIENCY: Primary | ICD-10-CM

## 2022-04-25 DIAGNOSIS — N28.9 RENAL INSUFFICIENCY: ICD-10-CM

## 2022-04-25 LAB
ALBUMIN SERPL-MCNC: 4.8 G/DL (ref 3.5–5.2)
ANION GAP SERPL CALCULATED.3IONS-SCNC: 13.9 MMOL/L (ref 5–15)
BASOPHILS # BLD AUTO: 0.1 10*3/MM3 (ref 0–0.2)
BASOPHILS NFR BLD AUTO: 0.6 % (ref 0–1.5)
BUN SERPL-MCNC: 14 MG/DL (ref 6–20)
BUN/CREAT SERPL: 9.2 (ref 7–25)
CALCIUM SPEC-SCNC: 9.8 MG/DL (ref 8.6–10.5)
CHLORIDE SERPL-SCNC: 104 MMOL/L (ref 98–107)
CO2 SERPL-SCNC: 21.1 MMOL/L (ref 22–29)
CREAT SERPL-MCNC: 1.52 MG/DL (ref 0.76–1.27)
DEPRECATED RDW RBC AUTO: 38.6 FL (ref 37–54)
EGFRCR SERPLBLD CKD-EPI 2021: 58.3 ML/MIN/1.73
EOSINOPHIL # BLD AUTO: 0.09 10*3/MM3 (ref 0–0.4)
EOSINOPHIL NFR BLD AUTO: 0.6 % (ref 0.3–6.2)
ERYTHROCYTE [DISTWIDTH] IN BLOOD BY AUTOMATED COUNT: 11.8 % (ref 12.3–15.4)
GLUCOSE SERPL-MCNC: 89 MG/DL (ref 65–99)
HCT VFR BLD AUTO: 47.1 % (ref 37.5–51)
HGB BLD-MCNC: 16.1 G/DL (ref 13–17.7)
IMM GRANULOCYTES # BLD AUTO: 0.13 10*3/MM3 (ref 0–0.05)
IMM GRANULOCYTES NFR BLD AUTO: 0.8 % (ref 0–0.5)
LYMPHOCYTES # BLD AUTO: 2.58 10*3/MM3 (ref 0.7–3.1)
LYMPHOCYTES NFR BLD AUTO: 16.7 % (ref 19.6–45.3)
MCH RBC QN AUTO: 30.9 PG (ref 26.6–33)
MCHC RBC AUTO-ENTMCNC: 34.2 G/DL (ref 31.5–35.7)
MCV RBC AUTO: 90.4 FL (ref 79–97)
MONOCYTES # BLD AUTO: 0.94 10*3/MM3 (ref 0.1–0.9)
MONOCYTES NFR BLD AUTO: 6.1 % (ref 5–12)
NEUTROPHILS NFR BLD AUTO: 11.58 10*3/MM3 (ref 1.7–7)
NEUTROPHILS NFR BLD AUTO: 75.2 % (ref 42.7–76)
NRBC BLD AUTO-RTO: 0 /100 WBC (ref 0–0.2)
PHOSPHATE SERPL-MCNC: 3.3 MG/DL (ref 2.5–4.5)
PLATELET # BLD AUTO: 393 10*3/MM3 (ref 140–450)
PMV BLD AUTO: 9.4 FL (ref 6–12)
POTASSIUM SERPL-SCNC: 4.2 MMOL/L (ref 3.5–5.2)
RBC # BLD AUTO: 5.21 10*6/MM3 (ref 4.14–5.8)
SODIUM SERPL-SCNC: 139 MMOL/L (ref 136–145)
WBC NRBC COR # BLD: 15.42 10*3/MM3 (ref 3.4–10.8)

## 2022-04-25 PROCEDURE — 85025 COMPLETE CBC W/AUTO DIFF WBC: CPT

## 2022-04-25 PROCEDURE — 80069 RENAL FUNCTION PANEL: CPT

## 2022-04-25 PROCEDURE — 36415 COLL VENOUS BLD VENIPUNCTURE: CPT

## 2022-04-25 PROCEDURE — 81001 URINALYSIS AUTO W/SCOPE: CPT

## 2022-04-26 LAB
BACTERIA UR QL AUTO: ABNORMAL /HPF
BILIRUB UR QL STRIP: NEGATIVE
CLARITY UR: CLEAR
COLOR UR: YELLOW
GLUCOSE UR STRIP-MCNC: NEGATIVE MG/DL
HGB UR QL STRIP.AUTO: NEGATIVE
HYALINE CASTS UR QL AUTO: ABNORMAL /LPF
KETONES UR QL STRIP: NEGATIVE
LEUKOCYTE ESTERASE UR QL STRIP.AUTO: ABNORMAL
NITRITE UR QL STRIP: NEGATIVE
PH UR STRIP.AUTO: 5.5 [PH] (ref 5–8)
PROT UR QL STRIP: ABNORMAL
RBC # UR STRIP: ABNORMAL /HPF
REF LAB TEST METHOD: ABNORMAL
SP GR UR STRIP: 1.02 (ref 1–1.03)
SQUAMOUS #/AREA URNS HPF: ABNORMAL /HPF
UROBILINOGEN UR QL STRIP: ABNORMAL
WBC # UR STRIP: ABNORMAL /HPF

## 2022-05-28 DIAGNOSIS — I10 ESSENTIAL HYPERTENSION: ICD-10-CM

## 2022-05-31 RX ORDER — LEVOTHYROXINE SODIUM 0.03 MG/1
TABLET ORAL
Qty: 90 TABLET | Refills: 1 | OUTPATIENT
Start: 2022-05-31

## 2022-05-31 RX ORDER — AMLODIPINE BESYLATE 5 MG/1
5 TABLET ORAL DAILY
Qty: 90 TABLET | Refills: 1 | OUTPATIENT
Start: 2022-05-31

## 2022-06-01 ENCOUNTER — APPOINTMENT (OUTPATIENT)
Dept: CT IMAGING | Facility: HOSPITAL | Age: 42
End: 2022-06-01

## 2022-06-01 ENCOUNTER — HOSPITAL ENCOUNTER (EMERGENCY)
Facility: HOSPITAL | Age: 42
Discharge: HOME | End: 2022-06-01
Attending: EMERGENCY MEDICINE | Admitting: EMERGENCY MEDICINE

## 2022-06-01 VITALS
OXYGEN SATURATION: 96 % | HEIGHT: 74 IN | TEMPERATURE: 97.5 F | WEIGHT: 195 LBS | SYSTOLIC BLOOD PRESSURE: 138 MMHG | RESPIRATION RATE: 24 BRPM | HEART RATE: 60 BPM | BODY MASS INDEX: 25.03 KG/M2 | DIASTOLIC BLOOD PRESSURE: 98 MMHG

## 2022-06-01 DIAGNOSIS — K43.9 VENTRAL HERNIA WITHOUT OBSTRUCTION OR GANGRENE: Primary | ICD-10-CM

## 2022-06-01 DIAGNOSIS — Z85.528 HISTORY OF RENAL CELL CANCER: ICD-10-CM

## 2022-06-01 DIAGNOSIS — R10.33 ACUTE PERIUMBILICAL PAIN: ICD-10-CM

## 2022-06-01 DIAGNOSIS — E27.8 ADRENAL NODULE: ICD-10-CM

## 2022-06-01 LAB
ALBUMIN SERPL-MCNC: 4.5 G/DL (ref 3.5–5.2)
ALBUMIN/GLOB SERPL: 1.5 G/DL
ALP SERPL-CCNC: 103 U/L (ref 39–117)
ALT SERPL W P-5'-P-CCNC: 18 U/L (ref 1–41)
ANION GAP SERPL CALCULATED.3IONS-SCNC: 10.8 MMOL/L (ref 5–15)
AST SERPL-CCNC: 16 U/L (ref 1–40)
BASOPHILS # BLD AUTO: 0.07 10*3/MM3 (ref 0–0.2)
BASOPHILS NFR BLD AUTO: 0.7 % (ref 0–1.5)
BILIRUB SERPL-MCNC: 0.4 MG/DL (ref 0–1.2)
BILIRUB UR QL STRIP: NEGATIVE
BUN SERPL-MCNC: 15 MG/DL (ref 6–20)
BUN/CREAT SERPL: 11.9 (ref 7–25)
CALCIUM SPEC-SCNC: 9.3 MG/DL (ref 8.6–10.5)
CHLORIDE SERPL-SCNC: 104 MMOL/L (ref 98–107)
CLARITY UR: CLEAR
CO2 SERPL-SCNC: 22.2 MMOL/L (ref 22–29)
COLOR UR: YELLOW
CREAT SERPL-MCNC: 1.26 MG/DL (ref 0.76–1.27)
DEPRECATED RDW RBC AUTO: 39.5 FL (ref 37–54)
EGFRCR SERPLBLD CKD-EPI 2021: 73 ML/MIN/1.73
EOSINOPHIL # BLD AUTO: 0.28 10*3/MM3 (ref 0–0.4)
EOSINOPHIL NFR BLD AUTO: 2.6 % (ref 0.3–6.2)
ERYTHROCYTE [DISTWIDTH] IN BLOOD BY AUTOMATED COUNT: 12 % (ref 12.3–15.4)
GLOBULIN UR ELPH-MCNC: 3.1 GM/DL
GLUCOSE SERPL-MCNC: 137 MG/DL (ref 65–99)
GLUCOSE UR STRIP-MCNC: NEGATIVE MG/DL
HCT VFR BLD AUTO: 45 % (ref 37.5–51)
HGB BLD-MCNC: 15.4 G/DL (ref 13–17.7)
HGB UR QL STRIP.AUTO: NEGATIVE
HOLD SPECIMEN: NORMAL
HOLD SPECIMEN: NORMAL
IMM GRANULOCYTES # BLD AUTO: 0.07 10*3/MM3 (ref 0–0.05)
IMM GRANULOCYTES NFR BLD AUTO: 0.7 % (ref 0–0.5)
KETONES UR QL STRIP: NEGATIVE
LEUKOCYTE ESTERASE UR QL STRIP.AUTO: NEGATIVE
LIPASE SERPL-CCNC: 30 U/L (ref 13–60)
LYMPHOCYTES # BLD AUTO: 2.62 10*3/MM3 (ref 0.7–3.1)
LYMPHOCYTES NFR BLD AUTO: 24.6 % (ref 19.6–45.3)
MCH RBC QN AUTO: 30.6 PG (ref 26.6–33)
MCHC RBC AUTO-ENTMCNC: 34.2 G/DL (ref 31.5–35.7)
MCV RBC AUTO: 89.3 FL (ref 79–97)
MONOCYTES # BLD AUTO: 0.7 10*3/MM3 (ref 0.1–0.9)
MONOCYTES NFR BLD AUTO: 6.6 % (ref 5–12)
NEUTROPHILS NFR BLD AUTO: 6.91 10*3/MM3 (ref 1.7–7)
NEUTROPHILS NFR BLD AUTO: 64.8 % (ref 42.7–76)
NITRITE UR QL STRIP: NEGATIVE
NRBC BLD AUTO-RTO: 0 /100 WBC (ref 0–0.2)
PH UR STRIP.AUTO: 6.5 [PH] (ref 5–8)
PLATELET # BLD AUTO: 332 10*3/MM3 (ref 140–450)
PMV BLD AUTO: 8.3 FL (ref 6–12)
POTASSIUM SERPL-SCNC: 4.3 MMOL/L (ref 3.5–5.2)
PROT SERPL-MCNC: 7.6 G/DL (ref 6–8.5)
PROT UR QL STRIP: NEGATIVE
RBC # BLD AUTO: 5.04 10*6/MM3 (ref 4.14–5.8)
SODIUM SERPL-SCNC: 137 MMOL/L (ref 136–145)
SP GR UR STRIP: 1.02 (ref 1–1.03)
UROBILINOGEN UR QL STRIP: NORMAL
WBC NRBC COR # BLD: 10.65 10*3/MM3 (ref 3.4–10.8)
WHOLE BLOOD HOLD COAG: NORMAL
WHOLE BLOOD HOLD SPECIMEN: NORMAL

## 2022-06-01 PROCEDURE — 81003 URINALYSIS AUTO W/O SCOPE: CPT

## 2022-06-01 PROCEDURE — 36415 COLL VENOUS BLD VENIPUNCTURE: CPT

## 2022-06-01 PROCEDURE — 0 IOPAMIDOL PER 1 ML: Performed by: EMERGENCY MEDICINE

## 2022-06-01 PROCEDURE — 96375 TX/PRO/DX INJ NEW DRUG ADDON: CPT

## 2022-06-01 PROCEDURE — 96374 THER/PROPH/DIAG INJ IV PUSH: CPT

## 2022-06-01 PROCEDURE — 85025 COMPLETE CBC W/AUTO DIFF WBC: CPT

## 2022-06-01 PROCEDURE — 80053 COMPREHEN METABOLIC PANEL: CPT

## 2022-06-01 PROCEDURE — 74177 CT ABD & PELVIS W/CONTRAST: CPT

## 2022-06-01 PROCEDURE — 99283 EMERGENCY DEPT VISIT LOW MDM: CPT

## 2022-06-01 PROCEDURE — 25010000002 MORPHINE PER 10 MG: Performed by: EMERGENCY MEDICINE

## 2022-06-01 PROCEDURE — 25010000002 ONDANSETRON PER 1 MG: Performed by: EMERGENCY MEDICINE

## 2022-06-01 PROCEDURE — 83690 ASSAY OF LIPASE: CPT

## 2022-06-01 RX ORDER — ONDANSETRON 2 MG/ML
4 INJECTION INTRAMUSCULAR; INTRAVENOUS ONCE
Status: COMPLETED | OUTPATIENT
Start: 2022-06-01 | End: 2022-06-01

## 2022-06-01 RX ORDER — SODIUM CHLORIDE 0.9 % (FLUSH) 0.9 %
10 SYRINGE (ML) INJECTION AS NEEDED
Status: DISCONTINUED | OUTPATIENT
Start: 2022-06-01 | End: 2022-06-01 | Stop reason: HOSPADM

## 2022-06-01 RX ORDER — ONDANSETRON 4 MG/1
4 TABLET, ORALLY DISINTEGRATING ORAL 4 TIMES DAILY PRN
Qty: 20 TABLET | Refills: 0 | Status: SHIPPED | OUTPATIENT
Start: 2022-06-01 | End: 2022-06-07

## 2022-06-01 RX ADMIN — IOPAMIDOL 100 ML: 755 INJECTION, SOLUTION INTRAVENOUS at 17:20

## 2022-06-01 RX ADMIN — ONDANSETRON 4 MG: 2 INJECTION INTRAMUSCULAR; INTRAVENOUS at 17:06

## 2022-06-01 RX ADMIN — MORPHINE SULFATE 4 MG: 4 INJECTION INTRAVENOUS at 17:06

## 2022-06-01 NOTE — ED PROVIDER NOTES
Subjective   Patient is a 42-year-old male presenting today due to abdominal pain x1 day.  Patient has a right kidney removed last April due to renal cancer and states that he noticed he had a hernia after that surgery.  Patient rates his pain a 7 out of 10 that waxes and wanes and took a Tylenol last night.  Patient states the pain is worse with bending over and feels better with laying back.  Patient admits to nausea and chills and denies fever and vomiting.      History provided by:  Patient  Abdominal Pain  Pain location:  Periumbilical  Pain radiates to:  Does not radiate  Pain severity:  Moderate  Timing:  Intermittent  Progression:  Waxing and waning  Context: previous surgery    Worsened by:  Movement and position changes  Ineffective treatments:  Acetaminophen  Associated symptoms: chills and nausea    Associated symptoms: no fever and no vomiting        Review of Systems   Constitutional: Positive for chills. Negative for fever.   HENT: Negative.    Eyes: Negative.    Respiratory: Negative.    Cardiovascular: Negative.    Gastrointestinal: Positive for abdominal pain and nausea. Negative for vomiting.   Endocrine: Negative.    Genitourinary: Negative.    Musculoskeletal: Negative.    Skin: Negative.    Allergic/Immunologic: Negative.    Neurological: Negative.    Hematological: Negative.    Psychiatric/Behavioral: Negative.        Past Medical History:   Diagnosis Date   • Adrenal mass (HCC)    • Allergies    • Anxiety    • Asthma    • Bipolar 1 disorder (HCC)    • Broken bones    • Condition not found     Hernia    • Depression    • Emphysema/COPD (HCC)    • Head injury    • Heart murmur    • High blood pressure    • Lumbago 11/17/2015   • Lumbar spinal stenosis 11/17/2015    L4-5   • Renal cancer (HCC) 05/17/2021   • Renal mass    • Skin disease    • SOB (shortness of breath)        No Known Allergies    Past Surgical History:   Procedure Laterality Date   • BACK SURGERY     • HAND SURGERY     • LEG  SURGERY     • LUMBAR LAMINECTOMY  12/07/2015    L4-5   • NEPHRECTOMY      Right kidney        Family History   Problem Relation Age of Onset   • Diabetes Paternal Grandmother    • Heart attack Paternal Grandmother        Social History     Socioeconomic History   • Marital status:    Tobacco Use   • Smoking status: Current Every Day Smoker     Packs/day: 1.00     Years: 25.00     Pack years: 25.00   • Smokeless tobacco: Never Used   Substance and Sexual Activity   • Alcohol use: Yes     Comment: Occasionally            Objective   Physical Exam  Vitals and nursing note reviewed.   Constitutional:       Appearance: Normal appearance.   HENT:      Head: Normocephalic and atraumatic.      Nose: Nose normal.      Mouth/Throat:      Mouth: Mucous membranes are moist.   Eyes:      Extraocular Movements: Extraocular movements intact.      Pupils: Pupils are equal, round, and reactive to light.   Cardiovascular:      Rate and Rhythm: Normal rate and regular rhythm.      Heart sounds: Normal heart sounds.   Pulmonary:      Effort: Pulmonary effort is normal.      Breath sounds: Normal breath sounds.   Abdominal:      General: Abdomen is flat. Bowel sounds are normal.      Palpations: Abdomen is soft.      Tenderness: There is abdominal tenderness. There is no guarding or rebound.       Musculoskeletal:         General: Normal range of motion.      Cervical back: Normal range of motion and neck supple.   Skin:     General: Skin is warm and dry.   Neurological:      General: No focal deficit present.      Mental Status: He is alert and oriented to person, place, and time.   Psychiatric:         Mood and Affect: Mood normal.         Behavior: Behavior normal.         Procedures           ED Course  ED Course as of 06/01/22 1802 Wed Jun 01, 2022   0553 Consulted with Dr. Morrow, he will have the patient follow-up in office. [AJ]      ED Course User Index  [AJ] Reina Crews PA-C                                                  MDM  Number of Diagnoses or Management Options  Diagnosis management comments: I have spoken with patient. I have explained the patient´s condition, diagnoses and treatment plan based on the information available to me at this time. I have answered the patient's questions and addressed any concerns. The patient has a good  understanding of the patient´s diagnosis, condition, and treatment plan as can be expected at this point. The vital signs have been stable. The patient´s condition is stable and appropriate for discharge from the emergency department.      The patient will pursue further outpatient evaluation with the primary care physician or other designated or consulting physician as outlined in the discharge instructions. They are agreeable to this plan of care and follow-up instructions have been explained in detail. The patient has received these instructions in written format and have expressed an understanding of the discharge instructions. The patient is aware that any significant change in condition or worsening of symptoms should prompt an immediate return to this or the closest emergency department or call to 911.         Amount and/or Complexity of Data Reviewed  Clinical lab tests: reviewed  Tests in the radiology section of CPT®: reviewed and ordered    Risk of Complications, Morbidity, and/or Mortality  Presenting problems: low  Diagnostic procedures: low  Management options: low    Patient Progress  Patient progress: stable      Final diagnoses:   History of renal cell cancer   Acute periumbilical pain   Adrenal nodule (HCC)   Ventral hernia without obstruction or gangrene       ED Disposition  ED Disposition     ED Disposition   Discharge    Condition   Stable    Comment   --             Ayo Morrow MD  [address] [facility] [address]  [place] [address]  [phone]      call tomorrow morning and schedule and appointment in office         Medication List      New Prescriptions     ondansetron ODT 4 MG disintegrating tablet  Commonly known as: ZOFRAN-ODT  Place 1 tablet on the tongue 4 (Four) Times a Day As Needed for Nausea.           Where to Get Your Medications      These medications were sent to [id number] - [place], [place] - [address] AT [facility] OF [address] & [place] - [phone]  - [phone]   [address] [place] [address]    Phone: [phone]   · ondansetron ODT 4 MG disintegrating tablet          Reina Crews PA-C  06/01/22 3760

## 2022-06-01 NOTE — DISCHARGE INSTRUCTIONS
Call Dr. Morrow's office in the AM and schedule an appointment in office     Follow up with your PCP for adrenal nodule    You can take zofran as needed for nausea and vomting    You can continue tylenol 1000 mg as needed for pain, do not exceed 4 g in 24 hours.

## 2022-06-07 ENCOUNTER — OFFICE VISIT (OUTPATIENT)
Dept: FAMILY MEDICINE CLINIC | Facility: CLINIC | Age: 42
End: 2022-06-07

## 2022-06-07 VITALS
OXYGEN SATURATION: 97 % | HEART RATE: 65 BPM | BODY MASS INDEX: 25.31 KG/M2 | SYSTOLIC BLOOD PRESSURE: 118 MMHG | HEIGHT: 74 IN | WEIGHT: 197.2 LBS | DIASTOLIC BLOOD PRESSURE: 80 MMHG | TEMPERATURE: 98.1 F

## 2022-06-07 DIAGNOSIS — L40.9 PSORIASIS: Primary | ICD-10-CM

## 2022-06-07 DIAGNOSIS — L21.9 SEBORRHEIC DERMATITIS: ICD-10-CM

## 2022-06-07 DIAGNOSIS — E03.9 ACQUIRED HYPOTHYROIDISM: ICD-10-CM

## 2022-06-07 DIAGNOSIS — I10 ESSENTIAL HYPERTENSION: ICD-10-CM

## 2022-06-07 DIAGNOSIS — M77.11 LATERAL EPICONDYLITIS OF RIGHT ELBOW: ICD-10-CM

## 2022-06-07 DIAGNOSIS — M25.521 PAIN IN JOINT OF RIGHT ELBOW: ICD-10-CM

## 2022-06-07 LAB
T4 FREE SERPL-MCNC: 1.02 NG/DL (ref 0.93–1.7)
TSH SERPL DL<=0.05 MIU/L-ACNC: 14.9 UIU/ML (ref 0.27–4.2)

## 2022-06-07 PROCEDURE — 84443 ASSAY THYROID STIM HORMONE: CPT | Performed by: NURSE PRACTITIONER

## 2022-06-07 PROCEDURE — 20550 NJX 1 TENDON SHEATH/LIGAMENT: CPT | Performed by: NURSE PRACTITIONER

## 2022-06-07 PROCEDURE — 84439 ASSAY OF FREE THYROXINE: CPT | Performed by: NURSE PRACTITIONER

## 2022-06-07 PROCEDURE — 99214 OFFICE O/P EST MOD 30 MIN: CPT | Performed by: NURSE PRACTITIONER

## 2022-06-07 RX ORDER — AMLODIPINE BESYLATE 5 MG/1
5 TABLET ORAL DAILY
Qty: 90 TABLET | Refills: 1 | Status: SHIPPED | OUTPATIENT
Start: 2022-06-07 | End: 2022-11-15

## 2022-06-07 RX ORDER — CLOBETASOL PROPIONATE 0.5 MG/G
1 CREAM TOPICAL 2 TIMES DAILY
Qty: 60 G | Refills: 1 | Status: ON HOLD | OUTPATIENT
Start: 2022-06-07 | End: 2022-12-18

## 2022-06-07 RX ORDER — TRIAMCINOLONE ACETONIDE 40 MG/ML
20 INJECTION, SUSPENSION INTRA-ARTICULAR; INTRAMUSCULAR ONCE
Status: COMPLETED | OUTPATIENT
Start: 2022-06-07 | End: 2022-06-07

## 2022-06-07 RX ORDER — LEVOTHYROXINE SODIUM 0.03 MG/1
25 TABLET ORAL DAILY
Qty: 90 TABLET | Refills: 1 | Status: SHIPPED | OUTPATIENT
Start: 2022-06-07 | End: 2022-07-26

## 2022-06-07 RX ADMIN — TRIAMCINOLONE ACETONIDE 20 MG: 40 INJECTION, SUSPENSION INTRA-ARTICULAR; INTRAMUSCULAR at 13:58

## 2022-06-07 NOTE — PROGRESS NOTES
"Chief Complaint  Med Refill and Elbow Injury (Right )    Subjective        Donte Stallings presents to [facility]  History of Present Illness  Today for follow-up on hypertension and hypothyroidism.  Blood pressures well controlled.  Denies chest pain, CP, palpitations.  He does have a history of psoriasis.  He has a rash on bilateral knees his right shin and his elbows.  Also notes some redness irritation in his mustache of his beard.    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  Pain is on the right side lateral side.  Pain radiates down to the hand and has also decreased in strength.    Objective   Vital Signs:  /80   Pulse 65   Temp 98.1 °F (36.7 °C)   Ht 188 cm (74\")   Wt 89.4 kg (197 lb 3.2 oz)   SpO2 97%   BMI 25.32 kg/m²   Estimated body mass index is 25.32 kg/m² as calculated from the following:    Height as of this encounter: 188 cm (74\").    Weight as of this encounter: 89.4 kg (197 lb 3.2 oz).           Physical Exam  Vitals reviewed.   Constitutional:       Appearance: Normal appearance. He is well-developed.   HENT:      Head: Normocephalic and atraumatic.      Right Ear: External ear normal.      Left Ear: External ear normal.      Mouth/Throat:      Pharynx: No oropharyngeal exudate.   Eyes:      Conjunctiva/sclera: Conjunctivae normal.      Pupils: Pupils are equal, round, and reactive to light.   Cardiovascular:      Rate and Rhythm: Normal rate and regular rhythm.      Heart sounds: No murmur heard.    No friction rub. No gallop.   Pulmonary:      Effort: Pulmonary effort is normal.      Breath sounds: Normal breath sounds. No wheezing or rhonchi.   Abdominal:      General: Bowel sounds are normal. There is no distension.      Palpations: Abdomen is soft.      Tenderness: There is no abdominal tenderness.   Musculoskeletal:      Right elbow: No swelling, deformity, effusion or lacerations. Normal range " of motion. Tenderness present in lateral epicondyle. No radial head, medial epicondyle or olecranon process tenderness.   Skin:     General: Skin is warm and dry.   Neurological:      Mental Status: He is alert and oriented to person, place, and time.   Psychiatric:         Mood and Affect: Mood and affect normal.         Behavior: Behavior normal.         Thought Content: Thought content normal.         Judgment: Judgment normal.        Result Review :         Injection Tendon or Ligament    Date/Time: 6/7/2022 1:41 PM  Performed by: Dawit Cazares APRN  Authorized by: Dawit Cazares APRN   Comments: Risk and benefits of corticosteroid injection for lateral epicondylitis on the right were discussed with patient prior to beginning the procedure.  She was given opportunity to ask questions.  She provided written consent to treatment.  Patient was placed in the seated position with her right elbow flex to about 90° resting comfortably at her side.  Area of maximal tenderness was noted just distal to the lateral epicondyle.  The skin overlying this area was cleansed with alcohol followed by iodine.  Ethyl chloride was used to anesthetize the skin.  Afterwards a 3 mL syringe containing 20 mg of Kenalog and 1 mL of 1% lidocaine without epinephrine was injected using a 25-gauge 1.5 inch needle.  Afterwards alcohol was used to clean the area and a Band-Aid was applied.  There were no immediate complications with the procedure.  Patient overall tolerated the procedure well and was in no acute distress afterwards.            Assessment and Plan   Diagnoses and all orders for this visit:    1. Psoriasis (Primary)    2. Seborrheic dermatitis    3. Essential hypertension  -     amLODIPine (NORVASC) 5 MG tablet; Take 1 tablet by mouth Daily.  Dispense: 90 tablet; Refill: 1    4. Acquired hypothyroidism  -     levothyroxine (SYNTHROID, LEVOTHROID) 25 MCG tablet; Take 1 tablet by mouth Daily.  Dispense: 90 tablet; Refill: 1  -      TSH+Free T4    5. Pain in joint of right elbow  -     triamcinolone acetonide (KENALOG-40) injection 20 mg    6. Lateral epicondylitis of right elbow    Other orders  -     clobetasol (TEMOVATE) 0.05 % cream; Apply 1 application topically to the appropriate area as directed 2 (Two) Times a Day.  Dispense: 60 g; Refill: 1  -     hydrocortisone 2.5 % cream; Apply 1 application topically to the appropriate area as directed 2 (Two) Times a Day.  Dispense: 28 g; Refill: 2  -     Injection Tendon or Ligament    For the psoriasis apply clobetasol cream twice daily.  Apply hydrocortisone cream to mustache area.  We will check TSH and free T4.  Blood pressures well controlled continue amlodipine 5 mg daily.  He recently went to the emergency department on 6/1/2022.  He was diagnosed with ventral hernia.  He is scheduled to see general surgery tomorrow.  He has 1 kidney and is not able to take NSAIDs.  For the lateral epicondylitis, will give a steroid injection.         Follow Up   No follow-ups on file.  Patient was given instructions and counseling regarding his condition or for health maintenance advice. Please see specific information pulled into the AVS if appropriate.

## 2022-06-08 ENCOUNTER — OFFICE VISIT (OUTPATIENT)
Dept: SURGERY | Facility: CLINIC | Age: 42
End: 2022-06-08

## 2022-06-08 ENCOUNTER — PREP FOR SURGERY (OUTPATIENT)
Dept: OTHER | Facility: HOSPITAL | Age: 42
End: 2022-06-08

## 2022-06-08 VITALS — RESPIRATION RATE: 15 BRPM | HEIGHT: 74 IN | WEIGHT: 195 LBS | BODY MASS INDEX: 25.03 KG/M2

## 2022-06-08 DIAGNOSIS — K43.2 INCISIONAL HERNIA, WITHOUT OBSTRUCTION OR GANGRENE: Primary | ICD-10-CM

## 2022-06-08 DIAGNOSIS — E03.9 HYPOTHYROIDISM, UNSPECIFIED TYPE: Primary | ICD-10-CM

## 2022-06-08 PROCEDURE — 99406 BEHAV CHNG SMOKING 3-10 MIN: CPT | Performed by: SURGERY

## 2022-06-08 PROCEDURE — 99204 OFFICE O/P NEW MOD 45 MIN: CPT | Performed by: SURGERY

## 2022-06-08 RX ORDER — SODIUM CHLORIDE 0.9 % (FLUSH) 0.9 %
10 SYRINGE (ML) INJECTION AS NEEDED
Status: CANCELLED | OUTPATIENT
Start: 2022-06-08

## 2022-06-08 RX ORDER — NICOTINE 21 MG/24HR
1 PATCH, TRANSDERMAL 24 HOURS TRANSDERMAL EVERY 24 HOURS
Qty: 30 PATCH | Refills: 0 | Status: ON HOLD | OUTPATIENT
Start: 2022-06-08 | End: 2022-12-18

## 2022-06-08 RX ORDER — CEFAZOLIN SODIUM 2 G/100ML
2 INJECTION, SOLUTION INTRAVENOUS ONCE
Status: CANCELLED | OUTPATIENT
Start: 2022-06-08 | End: 2022-06-08

## 2022-06-08 RX ORDER — SODIUM CHLORIDE, SODIUM LACTATE, POTASSIUM CHLORIDE, CALCIUM CHLORIDE 600; 310; 30; 20 MG/100ML; MG/100ML; MG/100ML; MG/100ML
50 INJECTION, SOLUTION INTRAVENOUS CONTINUOUS
Status: CANCELLED | OUTPATIENT
Start: 2022-06-08

## 2022-06-08 RX ORDER — SODIUM CHLORIDE 0.9 % (FLUSH) 0.9 %
10 SYRINGE (ML) INJECTION EVERY 12 HOURS SCHEDULED
Status: CANCELLED | OUTPATIENT
Start: 2022-06-08

## 2022-06-09 NOTE — H&P (VIEW-ONLY)
General Surgery/Colorectal Surgery Note    Patient Name:  Donte Stallings  YOB: 1980  [id number]    Referring Provider: Provider, No Known      Patient Care Team:  Dawit Cazares APRN as PCP - General (Nurse Practitioner)    Chief complaint hernia    Subjective .     History of present illness:    History of kidney cancer status post laparoscopic resection developing a bulge and an incision to the epigastric area since surgery approximately 1 year ago.  Increase in size since onset.  No history of the same.  No incarceration or strangulation.  Pain constant and worse with lifting and straining.  No recent chest pain.  No blood thinner use.  Positive tobacco abuse.  CT abdomen pelvis June 2022 with 2.5 cm supraumbilical hernia containing fat    History:  Past Medical History:   Diagnosis Date   • Adrenal mass (HCC)    • Allergies    • Anxiety    • Asthma    • Bipolar 1 disorder (HCC)    • Broken bones    • Condition not found     Hernia    • Depression    • Emphysema/COPD (HCC)    • Head injury    • Heart murmur    • High blood pressure    • Lumbago 11/17/2015   • Lumbar spinal stenosis 11/17/2015    L4-5   • Renal cancer (HCC) 05/17/2021   • Renal mass    • Skin disease    • SOB (shortness of breath)        Past Surgical History:   Procedure Laterality Date   • BACK SURGERY     • HAND SURGERY     • LEG SURGERY     • LUMBAR LAMINECTOMY  12/07/2015    L4-5   • NEPHRECTOMY      Right kidney        Family History   Problem Relation Age of Onset   • Diabetes Paternal Grandmother    • Heart attack Paternal Grandmother        Social History     Tobacco Use   • Smoking status: Current Every Day Smoker     Packs/day: 1.00     Years: 25.00     Pack years: 25.00   • Smokeless tobacco: Never Used   Substance Use Topics   • Alcohol use: Yes     Comment: Occasionally        Review of Systems  All systems were reviewed and negative except for:   Review of Systems   Constitutional: Negative for chills, fever and  unexpected weight loss.   HENT: Negative for congestion, nosebleeds and voice change.    Eyes: Negative for blurred vision, double vision and discharge.   Respiratory: Negative for apnea, chest tightness and shortness of breath.    Cardiovascular: Negative for chest pain and leg swelling.   Gastrointestinal:        See HPI   Endocrine: Negative for cold intolerance and heat intolerance.   Genitourinary: Negative for dysuria, hematuria and urgency.   Musculoskeletal: Negative for back pain, joint swelling and neck pain.   Skin: Negative for color change and dry skin.   Neurological: Negative for dizziness and confusion.   Hematological: Negative for adenopathy.   Psychiatric/Behavioral: Negative for agitation and behavioral problems.     MEDS:  Prior to Admission medications    Medication Sig Start Date End Date Taking? Authorizing Provider   amLODIPine (NORVASC) 5 MG tablet Take 1 tablet by mouth Daily. 6/7/22  Yes Dawit Cazares APRN   levothyroxine (SYNTHROID, LEVOTHROID) 25 MCG tablet Take 1 tablet by mouth Daily. 6/7/22  Yes Dawit Cazares APRN   clobetasol (TEMOVATE) 0.05 % cream Apply 1 application topically to the appropriate area as directed 2 (Two) Times a Day. 6/7/22   Dawit Cazares APRN   hydrocortisone 2.5 % cream Apply 1 application topically to the appropriate area as directed 2 (Two) Times a Day. 6/7/22   Dawit Cazares APRN   nicotine (NICODERM CQ) 21 MG/24HR patch Place 1 patch on the skin as directed by provider Daily. 6/8/22   Tay Ash MD        Allergies:  Patient has no known allergies.    Objective     Vital Signs   Resp:  [15] 15    Physical Exam:     General Appearance:    Alert, cooperative, in no acute distress   Head:    Normocephalic, without obvious abnormality, atraumatic   Eyes:          Conjunctivae and sclerae normal, no icterus,     Ears:    Ears appear intact with no abnormalities noted   Throat:   No oral lesions, no thrush, oral mucosa moist   Neck:   No  "adenopathy, supple, trachea midline, no thyromegaly   Back:     No kyphosis present, no scoliosis present, no skin lesions,      erythema or scars, no tenderness to percussion or                   palpation,   range of motion normal   Lungs:     Clear to auscultation,respirations regular, even and                  unlabored    Heart:    Regular rhythm and normal rate, normal S1 and S2, no            murmur, no gallop, no rub, no click   Chest Wall:    No abnormalities observed   Abdomen:     Normal bowel sounds, no masses, no organomegaly, soft        non-tender, non-distended, no guarding, no rebound                tenderness, reducible epigastric incisional hernia without evidence of obstruction or gangrene   Rectal:        Extremities:   Moves all extremities well, no edema, no cyanosis, no             redness   Pulses:   Pulses palpable and equal bilaterally   Skin:   No bleeding, bruising or rash   Lymph nodes:   No palpable adenopathy   Neurologic:   A/o x 4 with no deficits       Results Review:   {Results Review:[id number]::\"I reviewed the patient's new clinical results.\"    LABS/IMAGING:  Results for orders placed or performed in visit on 06/07/22   TSH+Free T4    Specimen: Arm, Right; Blood   Result Value Ref Range    TSH 14.900 (H) 0.270 - 4.200 uIU/mL    Free T4 1.02 0.93 - 1.70 ng/dL        Result Review :     Assessment & Plan     Initial reducible epigastric incisional hernia without evidence of obstruction or gangrene  Tobacco abuse    I reviewed the CAT scan images with the patient.  I explained to him what a hernia was.  I discussed the warning signs of incarceration and strangulation.  He was instructed to go to the emergency department for any concern.  I instructed him that he would need to quit smoking prior to elective hernia repair.  He is agreeable.  Smoking cessation counseling performed.  Nicotine patches given.  After he has quit smoking I recommended robotic possible open incisional hernia " repair with mesh.  I explained the procedure and recovery.  Benefits and alternatives discussed.  Risk of procedure including risk of anesthesia, bleeding, infection, mesh infection, hernia recurrence, conversion to open, heart attack, stroke, blood clot, pneumonia were discussed.  All questions answered.  He agrees with the plan.  Orders placed.  He was instructed to use chlorhexidine the night before surgery.  Thank you for the consult.    Donte Stallings  reports that he has been smoking. He has a 25.00 pack-year smoking history. He has never used smokeless tobacco.. I have educated him on the risk of diseases from using tobacco products such as cancer, COPD and heart disease.     I advised him to quit and he is willing to quit. We have discussed the following method/s for tobacco cessation:  Counseling Prescription Medicaiton.  Together we have set a quit date for 1 week from today.  He will follow up with me in 1 day or sooner to check on his progress.    I spent 3  minutes counseling the patient.                This document has been electronically signed by Tay Ash MD  June 9, 2022 08:34 EDT

## 2022-06-09 NOTE — PROGRESS NOTES
General Surgery/Colorectal Surgery Note    Patient Name:  Donte Stallings  YOB: 1980  [id number]    Referring Provider: Provider, No Known      Patient Care Team:  Dawit Cazares APRN as PCP - General (Nurse Practitioner)    Chief complaint hernia    Subjective .     History of present illness:    History of kidney cancer status post laparoscopic resection developing a bulge and an incision to the epigastric area since surgery approximately 1 year ago.  Increase in size since onset.  No history of the same.  No incarceration or strangulation.  Pain constant and worse with lifting and straining.  No recent chest pain.  No blood thinner use.  Positive tobacco abuse.  CT abdomen pelvis June 2022 with 2.5 cm supraumbilical hernia containing fat    History:  Past Medical History:   Diagnosis Date   • Adrenal mass (HCC)    • Allergies    • Anxiety    • Asthma    • Bipolar 1 disorder (HCC)    • Broken bones    • Condition not found     Hernia    • Depression    • Emphysema/COPD (HCC)    • Head injury    • Heart murmur    • High blood pressure    • Lumbago 11/17/2015   • Lumbar spinal stenosis 11/17/2015    L4-5   • Renal cancer (HCC) 05/17/2021   • Renal mass    • Skin disease    • SOB (shortness of breath)        Past Surgical History:   Procedure Laterality Date   • BACK SURGERY     • HAND SURGERY     • LEG SURGERY     • LUMBAR LAMINECTOMY  12/07/2015    L4-5   • NEPHRECTOMY      Right kidney        Family History   Problem Relation Age of Onset   • Diabetes Paternal Grandmother    • Heart attack Paternal Grandmother        Social History     Tobacco Use   • Smoking status: Current Every Day Smoker     Packs/day: 1.00     Years: 25.00     Pack years: 25.00   • Smokeless tobacco: Never Used   Substance Use Topics   • Alcohol use: Yes     Comment: Occasionally        Review of Systems  All systems were reviewed and negative except for:   Review of Systems   Constitutional: Negative for chills, fever and  unexpected weight loss.   HENT: Negative for congestion, nosebleeds and voice change.    Eyes: Negative for blurred vision, double vision and discharge.   Respiratory: Negative for apnea, chest tightness and shortness of breath.    Cardiovascular: Negative for chest pain and leg swelling.   Gastrointestinal:        See HPI   Endocrine: Negative for cold intolerance and heat intolerance.   Genitourinary: Negative for dysuria, hematuria and urgency.   Musculoskeletal: Negative for back pain, joint swelling and neck pain.   Skin: Negative for color change and dry skin.   Neurological: Negative for dizziness and confusion.   Hematological: Negative for adenopathy.   Psychiatric/Behavioral: Negative for agitation and behavioral problems.     MEDS:  Prior to Admission medications    Medication Sig Start Date End Date Taking? Authorizing Provider   amLODIPine (NORVASC) 5 MG tablet Take 1 tablet by mouth Daily. 6/7/22  Yes Dawit Cazares APRN   levothyroxine (SYNTHROID, LEVOTHROID) 25 MCG tablet Take 1 tablet by mouth Daily. 6/7/22  Yes Dawit Cazares APRN   clobetasol (TEMOVATE) 0.05 % cream Apply 1 application topically to the appropriate area as directed 2 (Two) Times a Day. 6/7/22   Dawit Cazares APRN   hydrocortisone 2.5 % cream Apply 1 application topically to the appropriate area as directed 2 (Two) Times a Day. 6/7/22   Dawit Cazares APRN   nicotine (NICODERM CQ) 21 MG/24HR patch Place 1 patch on the skin as directed by provider Daily. 6/8/22   Tay Ash MD        Allergies:  Patient has no known allergies.    Objective     Vital Signs   Resp:  [15] 15    Physical Exam:     General Appearance:    Alert, cooperative, in no acute distress   Head:    Normocephalic, without obvious abnormality, atraumatic   Eyes:          Conjunctivae and sclerae normal, no icterus,     Ears:    Ears appear intact with no abnormalities noted   Throat:   No oral lesions, no thrush, oral mucosa moist   Neck:   No  "adenopathy, supple, trachea midline, no thyromegaly   Back:     No kyphosis present, no scoliosis present, no skin lesions,      erythema or scars, no tenderness to percussion or                   palpation,   range of motion normal   Lungs:     Clear to auscultation,respirations regular, even and                  unlabored    Heart:    Regular rhythm and normal rate, normal S1 and S2, no            murmur, no gallop, no rub, no click   Chest Wall:    No abnormalities observed   Abdomen:     Normal bowel sounds, no masses, no organomegaly, soft        non-tender, non-distended, no guarding, no rebound                tenderness, reducible epigastric incisional hernia without evidence of obstruction or gangrene   Rectal:        Extremities:   Moves all extremities well, no edema, no cyanosis, no             redness   Pulses:   Pulses palpable and equal bilaterally   Skin:   No bleeding, bruising or rash   Lymph nodes:   No palpable adenopathy   Neurologic:   A/o x 4 with no deficits       Results Review:   {Results Review:52919::\"I reviewed the patient's new clinical results.\"    LABS/IMAGING:  Results for orders placed or performed in visit on 06/07/22   TSH+Free T4    Specimen: Arm, Right; Blood   Result Value Ref Range    TSH 14.900 (H) 0.270 - 4.200 uIU/mL    Free T4 1.02 0.93 - 1.70 ng/dL        Result Review :     Assessment & Plan     Initial reducible epigastric incisional hernia without evidence of obstruction or gangrene  Tobacco abuse    I reviewed the CAT scan images with the patient.  I explained to him what a hernia was.  I discussed the warning signs of incarceration and strangulation.  He was instructed to go to the emergency department for any concern.  I instructed him that he would need to quit smoking prior to elective hernia repair.  He is agreeable.  Smoking cessation counseling performed.  Nicotine patches given.  After he has quit smoking I recommended robotic possible open incisional hernia " repair with mesh.  I explained the procedure and recovery.  Benefits and alternatives discussed.  Risk of procedure including risk of anesthesia, bleeding, infection, mesh infection, hernia recurrence, conversion to open, heart attack, stroke, blood clot, pneumonia were discussed.  All questions answered.  He agrees with the plan.  Orders placed.  He was instructed to use chlorhexidine the night before surgery.  Thank you for the consult.    Donte Stallings  reports that he has been smoking. He has a 25.00 pack-year smoking history. He has never used smokeless tobacco.. I have educated him on the risk of diseases from using tobacco products such as cancer, COPD and heart disease.     I advised him to quit and he is willing to quit. We have discussed the following method/s for tobacco cessation:  Counseling Prescription Medicaiton.  Together we have set a quit date for 1 week from today.  He will follow up with me in 1 day or sooner to check on his progress.    I spent 3  minutes counseling the patient.                This document has been electronically signed by Tay Ash MD  June 9, 2022 08:34 EDT

## 2022-06-10 ENCOUNTER — LAB (OUTPATIENT)
Dept: LAB | Facility: HOSPITAL | Age: 42
End: 2022-06-10

## 2022-06-10 DIAGNOSIS — K43.2 INCISIONAL HERNIA, WITHOUT OBSTRUCTION OR GANGRENE: ICD-10-CM

## 2022-06-10 LAB — SARS-COV-2 RNA PNL SPEC NAA+PROBE: NOT DETECTED

## 2022-06-10 PROCEDURE — U0004 COV-19 TEST NON-CDC HGH THRU: HCPCS

## 2022-06-10 NOTE — PRE-PROCEDURE INSTRUCTIONS
Patient instructed to have no food past midnight, clears up to 2 hours prior to arrival time. Patient instructed to wear no lotions, jewelry or piercing's day of surgery.  Patient to shower with surgical soap am of surgery. Patient to take am medications.

## 2022-06-15 ENCOUNTER — ANESTHESIA (OUTPATIENT)
Dept: PERIOP | Facility: HOSPITAL | Age: 42
End: 2022-06-15

## 2022-06-15 ENCOUNTER — ANESTHESIA EVENT (OUTPATIENT)
Dept: PERIOP | Facility: HOSPITAL | Age: 42
End: 2022-06-15

## 2022-06-15 ENCOUNTER — HOSPITAL ENCOUNTER (OUTPATIENT)
Facility: HOSPITAL | Age: 42
Setting detail: HOSPITAL OUTPATIENT SURGERY
Discharge: HOME | End: 2022-06-15
Attending: SURGERY | Admitting: SURGERY

## 2022-06-15 VITALS
HEIGHT: 73 IN | RESPIRATION RATE: 16 BRPM | BODY MASS INDEX: 25.68 KG/M2 | WEIGHT: 193.78 LBS | DIASTOLIC BLOOD PRESSURE: 86 MMHG | OXYGEN SATURATION: 94 % | HEART RATE: 60 BPM | TEMPERATURE: 96.7 F | SYSTOLIC BLOOD PRESSURE: 125 MMHG

## 2022-06-15 DIAGNOSIS — K43.2 INCISIONAL HERNIA, WITHOUT OBSTRUCTION OR GANGRENE: ICD-10-CM

## 2022-06-15 LAB — QT INTERVAL: 410 MS

## 2022-06-15 PROCEDURE — 49654 PR LAP, INCISIONAL HERNIA REPAIR,REDUCIBLE: CPT | Performed by: SURGERY

## 2022-06-15 PROCEDURE — C1781 MESH (IMPLANTABLE): HCPCS | Performed by: SURGERY

## 2022-06-15 PROCEDURE — 25010000002 CEFAZOLIN IN DEXTROSE 2-4 GM/100ML-% SOLUTION: Performed by: SURGERY

## 2022-06-15 PROCEDURE — 25010000002 KETOROLAC TROMETHAMINE PER 15 MG: Performed by: NURSE ANESTHETIST, CERTIFIED REGISTERED

## 2022-06-15 PROCEDURE — 25010000002 PROPOFOL 10 MG/ML EMULSION: Performed by: NURSE ANESTHETIST, CERTIFIED REGISTERED

## 2022-06-15 PROCEDURE — 25010000002 BUPRENORPHINE PER 0.1 MG: Performed by: SURGERY

## 2022-06-15 PROCEDURE — 93005 ELECTROCARDIOGRAM TRACING: CPT | Performed by: ANESTHESIOLOGY

## 2022-06-15 PROCEDURE — 25010000002 ONDANSETRON PER 1 MG: Performed by: NURSE ANESTHETIST, CERTIFIED REGISTERED

## 2022-06-15 PROCEDURE — 25010000002 HYDROMORPHONE 1 MG/ML SOLUTION: Performed by: NURSE ANESTHETIST, CERTIFIED REGISTERED

## 2022-06-15 PROCEDURE — 25010000002 MIDAZOLAM PER 1 MG: Performed by: ANESTHESIOLOGY

## 2022-06-15 PROCEDURE — 25010000002 DEXAMETHASONE PER 1 MG: Performed by: NURSE ANESTHETIST, CERTIFIED REGISTERED

## 2022-06-15 PROCEDURE — 93010 ELECTROCARDIOGRAM REPORT: CPT | Performed by: SPECIALIST

## 2022-06-15 PROCEDURE — 25010000002 FENTANYL CITRATE (PF) 50 MCG/ML SOLUTION: Performed by: NURSE ANESTHETIST, CERTIFIED REGISTERED

## 2022-06-15 PROCEDURE — 25010000002 DEXAMETHASONE SODIUM PHOSPHATE 100 MG/10ML SOLUTION: Performed by: SURGERY

## 2022-06-15 PROCEDURE — 49654 PR LAP, INCISIONAL HERNIA REPAIR,REDUCIBLE: CPT

## 2022-06-15 PROCEDURE — 49652 PR LAP, VENTRAL HERNIA REPAIR,REDUCIBLE: CPT | Performed by: SURGERY

## 2022-06-15 PROCEDURE — 49652 PR LAP, VENTRAL HERNIA REPAIR,REDUCIBLE: CPT

## 2022-06-15 DEVICE — VENTRALIGHT ST MESH
Type: IMPLANTABLE DEVICE | Site: ABDOMEN | Status: FUNCTIONAL
Brand: VENTRALIGHT ST

## 2022-06-15 DEVICE — ABSORBABLE WOUND CLOSURE DEVICE
Type: IMPLANTABLE DEVICE | Site: ABDOMEN | Status: FUNCTIONAL
Brand: SYNETURE

## 2022-06-15 DEVICE — ABSORBABLE WOUND CLOSURE DEVICE
Type: IMPLANTABLE DEVICE | Site: ABDOMEN | Status: FUNCTIONAL
Brand: V-LOC 90

## 2022-06-15 RX ORDER — PROMETHAZINE HYDROCHLORIDE 12.5 MG/1
25 TABLET ORAL ONCE AS NEEDED
Status: DISCONTINUED | OUTPATIENT
Start: 2022-06-15 | End: 2022-06-15 | Stop reason: HOSPADM

## 2022-06-15 RX ORDER — FENTANYL CITRATE 50 UG/ML
INJECTION, SOLUTION INTRAMUSCULAR; INTRAVENOUS AS NEEDED
Status: DISCONTINUED | OUTPATIENT
Start: 2022-06-15 | End: 2022-06-15 | Stop reason: SURG

## 2022-06-15 RX ORDER — KETAMINE HYDROCHLORIDE 50 MG/ML
INJECTION, SOLUTION, CONCENTRATE INTRAMUSCULAR; INTRAVENOUS AS NEEDED
Status: DISCONTINUED | OUTPATIENT
Start: 2022-06-15 | End: 2022-06-15 | Stop reason: SURG

## 2022-06-15 RX ORDER — SODIUM CHLORIDE 0.9 % (FLUSH) 0.9 %
10 SYRINGE (ML) INJECTION EVERY 12 HOURS SCHEDULED
Status: DISCONTINUED | OUTPATIENT
Start: 2022-06-15 | End: 2022-06-15 | Stop reason: HOSPADM

## 2022-06-15 RX ORDER — ACETAMINOPHEN 500 MG
1000 TABLET ORAL ONCE
Status: COMPLETED | OUTPATIENT
Start: 2022-06-15 | End: 2022-06-15

## 2022-06-15 RX ORDER — CEFAZOLIN SODIUM 2 G/100ML
2 INJECTION, SOLUTION INTRAVENOUS ONCE
Status: COMPLETED | OUTPATIENT
Start: 2022-06-15 | End: 2022-06-15

## 2022-06-15 RX ORDER — MEPERIDINE HYDROCHLORIDE 25 MG/ML
12.5 INJECTION INTRAMUSCULAR; INTRAVENOUS; SUBCUTANEOUS
Status: DISCONTINUED | OUTPATIENT
Start: 2022-06-15 | End: 2022-06-15 | Stop reason: HOSPADM

## 2022-06-15 RX ORDER — MIDAZOLAM HYDROCHLORIDE 1 MG/ML
2 INJECTION INTRAMUSCULAR; INTRAVENOUS ONCE
Status: COMPLETED | OUTPATIENT
Start: 2022-06-15 | End: 2022-06-15

## 2022-06-15 RX ORDER — ONDANSETRON 2 MG/ML
4 INJECTION INTRAMUSCULAR; INTRAVENOUS ONCE AS NEEDED
Status: DISCONTINUED | OUTPATIENT
Start: 2022-06-15 | End: 2022-06-15 | Stop reason: HOSPADM

## 2022-06-15 RX ORDER — ROCURONIUM BROMIDE 10 MG/ML
INJECTION, SOLUTION INTRAVENOUS AS NEEDED
Status: DISCONTINUED | OUTPATIENT
Start: 2022-06-15 | End: 2022-06-15 | Stop reason: SURG

## 2022-06-15 RX ORDER — PROPOFOL 10 MG/ML
VIAL (ML) INTRAVENOUS AS NEEDED
Status: DISCONTINUED | OUTPATIENT
Start: 2022-06-15 | End: 2022-06-15 | Stop reason: SURG

## 2022-06-15 RX ORDER — KETOROLAC TROMETHAMINE 30 MG/ML
INJECTION, SOLUTION INTRAMUSCULAR; INTRAVENOUS AS NEEDED
Status: DISCONTINUED | OUTPATIENT
Start: 2022-06-15 | End: 2022-06-15 | Stop reason: SURG

## 2022-06-15 RX ORDER — DEXAMETHASONE SODIUM PHOSPHATE 4 MG/ML
INJECTION, SOLUTION INTRA-ARTICULAR; INTRALESIONAL; INTRAMUSCULAR; INTRAVENOUS; SOFT TISSUE AS NEEDED
Status: DISCONTINUED | OUTPATIENT
Start: 2022-06-15 | End: 2022-06-15 | Stop reason: SURG

## 2022-06-15 RX ORDER — SODIUM CHLORIDE, SODIUM LACTATE, POTASSIUM CHLORIDE, CALCIUM CHLORIDE 600; 310; 30; 20 MG/100ML; MG/100ML; MG/100ML; MG/100ML
50 INJECTION, SOLUTION INTRAVENOUS CONTINUOUS
Status: DISCONTINUED | OUTPATIENT
Start: 2022-06-15 | End: 2022-06-15 | Stop reason: HOSPADM

## 2022-06-15 RX ORDER — OXYCODONE HYDROCHLORIDE 5 MG/1
5 TABLET ORAL
Status: DISCONTINUED | OUTPATIENT
Start: 2022-06-15 | End: 2022-06-15 | Stop reason: HOSPADM

## 2022-06-15 RX ORDER — POLYETHYLENE GLYCOL 3350 17 G/17G
17 POWDER, FOR SOLUTION ORAL DAILY
Qty: 5 PACKET | Refills: 0 | Status: ON HOLD | OUTPATIENT
Start: 2022-06-15 | End: 2022-12-18

## 2022-06-15 RX ORDER — SODIUM CHLORIDE, SODIUM LACTATE, POTASSIUM CHLORIDE, CALCIUM CHLORIDE 600; 310; 30; 20 MG/100ML; MG/100ML; MG/100ML; MG/100ML
9 INJECTION, SOLUTION INTRAVENOUS CONTINUOUS PRN
Status: DISCONTINUED | OUTPATIENT
Start: 2022-06-15 | End: 2022-06-15 | Stop reason: HOSPADM

## 2022-06-15 RX ORDER — HYDROCODONE BITARTRATE AND ACETAMINOPHEN 5; 325 MG/1; MG/1
1 TABLET ORAL EVERY 6 HOURS PRN
Qty: 12 TABLET | Refills: 0 | Status: ON HOLD | OUTPATIENT
Start: 2022-06-15 | End: 2022-12-18

## 2022-06-15 RX ORDER — LIDOCAINE HYDROCHLORIDE 20 MG/ML
INJECTION, SOLUTION EPIDURAL; INFILTRATION; INTRACAUDAL; PERINEURAL AS NEEDED
Status: DISCONTINUED | OUTPATIENT
Start: 2022-06-15 | End: 2022-06-15 | Stop reason: SURG

## 2022-06-15 RX ORDER — ONDANSETRON 2 MG/ML
INJECTION INTRAMUSCULAR; INTRAVENOUS AS NEEDED
Status: DISCONTINUED | OUTPATIENT
Start: 2022-06-15 | End: 2022-06-15 | Stop reason: SURG

## 2022-06-15 RX ORDER — PROMETHAZINE HYDROCHLORIDE 25 MG/1
25 SUPPOSITORY RECTAL ONCE AS NEEDED
Status: DISCONTINUED | OUTPATIENT
Start: 2022-06-15 | End: 2022-06-15 | Stop reason: HOSPADM

## 2022-06-15 RX ORDER — SODIUM CHLORIDE 0.9 % (FLUSH) 0.9 %
10 SYRINGE (ML) INJECTION AS NEEDED
Status: DISCONTINUED | OUTPATIENT
Start: 2022-06-15 | End: 2022-06-15 | Stop reason: HOSPADM

## 2022-06-15 RX ADMIN — SODIUM CHLORIDE, POTASSIUM CHLORIDE, SODIUM LACTATE AND CALCIUM CHLORIDE 9 ML/HR: 600; 310; 30; 20 INJECTION, SOLUTION INTRAVENOUS at 07:16

## 2022-06-15 RX ADMIN — PROPOFOL 200 MG: 10 INJECTION, EMULSION INTRAVENOUS at 07:42

## 2022-06-15 RX ADMIN — ROCURONIUM BROMIDE 50 MG: 10 INJECTION INTRAVENOUS at 07:42

## 2022-06-15 RX ADMIN — FENTANYL CITRATE 100 MCG: 50 INJECTION, SOLUTION INTRAMUSCULAR; INTRAVENOUS at 07:42

## 2022-06-15 RX ADMIN — LIDOCAINE HYDROCHLORIDE 100 MG: 20 INJECTION, SOLUTION EPIDURAL; INFILTRATION; INTRACAUDAL; PERINEURAL at 07:42

## 2022-06-15 RX ADMIN — SUGAMMADEX 200 MG: 100 INJECTION, SOLUTION INTRAVENOUS at 09:02

## 2022-06-15 RX ADMIN — KETOROLAC TROMETHAMINE 30 MG: 30 INJECTION, SOLUTION INTRAMUSCULAR; INTRAVENOUS at 08:00

## 2022-06-15 RX ADMIN — MIDAZOLAM HYDROCHLORIDE 2 MG: 1 INJECTION, SOLUTION INTRAMUSCULAR; INTRAVENOUS at 07:33

## 2022-06-15 RX ADMIN — KETAMINE HYDROCHLORIDE 25 MG: 50 INJECTION, SOLUTION INTRAMUSCULAR; INTRAVENOUS at 08:00

## 2022-06-15 RX ADMIN — CEFAZOLIN SODIUM 2 G: 2 INJECTION, SOLUTION INTRAVENOUS at 07:38

## 2022-06-15 RX ADMIN — OXYCODONE HYDROCHLORIDE 5 MG: 5 TABLET ORAL at 10:05

## 2022-06-15 RX ADMIN — HYDROMORPHONE HYDROCHLORIDE 0.5 MG: 1 INJECTION, SOLUTION INTRAMUSCULAR; INTRAVENOUS; SUBCUTANEOUS at 09:45

## 2022-06-15 RX ADMIN — ONDANSETRON 4 MG: 2 INJECTION INTRAMUSCULAR; INTRAVENOUS at 08:00

## 2022-06-15 RX ADMIN — SODIUM CHLORIDE, POTASSIUM CHLORIDE, SODIUM LACTATE AND CALCIUM CHLORIDE: 600; 310; 30; 20 INJECTION, SOLUTION INTRAVENOUS at 08:13

## 2022-06-15 RX ADMIN — ACETAMINOPHEN 1000 MG: 500 TABLET ORAL at 07:16

## 2022-06-15 RX ADMIN — KETAMINE HYDROCHLORIDE 25 MG: 50 INJECTION, SOLUTION INTRAMUSCULAR; INTRAVENOUS at 07:42

## 2022-06-15 RX ADMIN — DEXAMETHASONE SODIUM PHOSPHATE 4 MG: 4 INJECTION, SOLUTION INTRA-ARTICULAR; INTRALESIONAL; INTRAMUSCULAR; INTRAVENOUS; SOFT TISSUE at 08:00

## 2022-06-15 RX ADMIN — ROCURONIUM BROMIDE 30 MG: 10 INJECTION INTRAVENOUS at 08:18

## 2022-06-15 NOTE — ANESTHESIA PREPROCEDURE EVALUATION
Anesthesia Evaluation     Patient summary reviewed and Nursing notes reviewed                Airway   Mallampati: I  TM distance: >3 FB  Neck ROM: full  No difficulty expected  Dental    (+) poor dentition    Pulmonary - normal exam    breath sounds clear to auscultation  (+) COPD, asthma,shortness of breath,   Cardiovascular - normal exam    Rhythm: regular  Rate: normal    (+) hypertension, valvular problems/murmurs,       Neuro/Psych  (+) dizziness/light headedness, numbness, psychiatric history Anxiety, Depression and Bipolar,    GI/Hepatic/Renal/Endo    (+)   renal disease, thyroid problem     Musculoskeletal (-) negative ROS    Abdominal    Substance History - negative use     OB/GYN negative ob/gyn ROS         Other      history of cancer                    Anesthesia Plan    ASA 4     general     intravenous induction     Anesthetic plan, risks, benefits, and alternatives have been provided, discussed and informed consent has been obtained with: patient.        CODE STATUS:

## 2022-06-15 NOTE — DISCHARGE INSTRUCTIONS
DISCHARGE INSTRUCTIONS  HERNIA      For your surgery you had:  General anesthesia (you may have a sore throat for the first 24 hours)  Local anesthesia  You may experience dizziness, drowsiness, or light-headedness for several hours following surgery/procedure.  Do not stay alone today or tonight.  Limit your activity for 24 hours.  Resume your diet slowly.  Follow whatever special dietary instructions you may have been given by your doctor.  You should not drive, operate machinery, drink alcohol, or sign legally binding documents for 24 hours or while you are taking pain medication.    NOTIFY YOUR DOCTOR IF YOU EXPERIENCE ANY OF THE FOLLOWING:  Temperature greater than 101 degrees Fahrenheit  Shaking Chills  Redness or excessive drainage from incision  Nausea, vomiting and/or pain that is not controlled by prescribed medications  Increase in bleeding or bleeding that is excessive  Unable to urinate in 6 hours after surgery  If unable to reach your doctor, please go to the closest Emergency Room [x] You may shower: in 48 hours, do not submerge  Apply an ice pack for 24-48 hours.  Do not do any heavy lifting, pushing or pulling.  You may walk up and down stairs.  You may ride in a car but do not drive until instructed by your physician.  Avoid constipation.  If unable to urinate in 6 to 8 hours after surgery or urinating frequently in small amounts, notify your doctor or go to the nearest Emergency Room.  Medications per physician instructions as indicated on Discharge Medication Information Sheet.    SPECIAL INSTRUCTIONS:  Wear abdominal binder for next 2 days. May remove when sleeping.    Last dose of pain medication was given at:   Tylenol (1000 mg) last at 7:15am. Do not exceed 1000 mg of tylenol in a 24 hour period.  Toradol last at 8am, may take ibuprofen next at 2pm if needed and able.  Oxycodone last at 10:05am, may take norco next at 2:05pm if needed.

## 2022-06-15 NOTE — ANESTHESIA POSTPROCEDURE EVALUATION
Patient: Donte Stallings    Procedure Summary     Date: 06/15/22 Room / Location: [facility] OR 08 / [facility] MAIN OR    Anesthesia Start: 0738 Anesthesia Stop: 0917    Procedure: VENTRAL Incisional HERNIA REPAIR LAPAROSCOPIC WITH DAVINCI ROBOT with mesh (N/A Abdomen) Diagnosis:       Incisional hernia, without obstruction or gangrene      (Incisional hernia, without obstruction or gangrene [K43.2])    Surgeons: Tay Ash MD Provider: Chinmay Chacon MD    Anesthesia Type: general ASA Status: 4          Anesthesia Type: general    Vitals  Vitals Value Taken Time   /85 06/15/22 1005   Temp 36.3 °C (97.4 °F) 06/15/22 1005   Pulse 63 06/15/22 1005   Resp 16 06/15/22 1005   SpO2 97 % 06/15/22 1005           Post Anesthesia Care and Evaluation    Patient location during evaluation: bedside  Patient participation: complete - patient participated  Level of consciousness: awake and alert  Pain management: adequate    Airway patency: patent  Anesthetic complications: No anesthetic complications  PONV Status: none  Cardiovascular status: acceptable  Respiratory status: acceptable  Hydration status: acceptable    Comments: An Anesthesiologist personally participated in the most demanding procedures (including induction and emergence if applicable) in the anesthesia plan, monitored the course of anesthesia administration at frequent intervals and remained physically present and available for immediate diagnosis and treatment of emergencies.

## 2022-06-20 ENCOUNTER — TELEPHONE (OUTPATIENT)
Dept: SURGERY | Facility: CLINIC | Age: 42
End: 2022-06-20

## 2022-06-20 DIAGNOSIS — K42.9 UMBILICAL HERNIA WITHOUT OBSTRUCTION AND WITHOUT GANGRENE: Primary | ICD-10-CM

## 2022-06-20 NOTE — TELEPHONE ENCOUNTER
I called the patient and told him Dr. Ash is not in the office this afternoon, and asked if he is okay with message being addressed in the morning, and he said that it is okay.

## 2022-06-20 NOTE — TELEPHONE ENCOUNTER
Robotic repair of initial reducible umbilical hernia and initial reducible incisional hernia with mesh on 06/15/22.    Patient asked for refill on pain medication.  Pharmacy verified with patient.

## 2022-06-21 RX ORDER — TRAMADOL HYDROCHLORIDE 50 MG/1
50 TABLET ORAL EVERY 6 HOURS PRN
Qty: 12 TABLET | Refills: 0 | Status: ON HOLD | OUTPATIENT
Start: 2022-06-21 | End: 2022-12-18

## 2022-07-13 ENCOUNTER — OFFICE VISIT (OUTPATIENT)
Dept: SURGERY | Facility: CLINIC | Age: 42
End: 2022-07-13

## 2022-07-13 VITALS — BODY MASS INDEX: 24.77 KG/M2 | HEIGHT: 74 IN | WEIGHT: 193 LBS | RESPIRATION RATE: 18 BRPM

## 2022-07-13 DIAGNOSIS — Z72.0 TOBACCO ABUSE: ICD-10-CM

## 2022-07-13 DIAGNOSIS — K43.2 INCISIONAL HERNIA, WITHOUT OBSTRUCTION OR GANGRENE: Primary | ICD-10-CM

## 2022-07-13 PROCEDURE — 99024 POSTOP FOLLOW-UP VISIT: CPT | Performed by: SURGERY

## 2022-07-13 PROCEDURE — 99406 BEHAV CHNG SMOKING 3-10 MIN: CPT | Performed by: SURGERY

## 2022-07-26 ENCOUNTER — TELEPHONE (OUTPATIENT)
Dept: FAMILY MEDICINE CLINIC | Facility: CLINIC | Age: 42
End: 2022-07-26

## 2022-07-26 DIAGNOSIS — E03.9 ACQUIRED HYPOTHYROIDISM: ICD-10-CM

## 2022-07-26 RX ORDER — LEVOTHYROXINE SODIUM 0.03 MG/1
25 TABLET ORAL DAILY
Qty: 90 TABLET | Refills: 1 | Status: CANCELLED | OUTPATIENT
Start: 2022-07-26

## 2022-07-26 RX ORDER — LEVOTHYROXINE SODIUM 0.05 MG/1
50 TABLET ORAL DAILY
Qty: 90 TABLET | Refills: 3 | Status: SHIPPED | OUTPATIENT
Start: 2022-07-26

## 2022-07-26 NOTE — TELEPHONE ENCOUNTER
Caller: DILCIA FRASER    Relationship: Emergency Contact    Best call back number: [phone]    Requested Prescriptions:   Requested Prescriptions     Pending Prescriptions Disp Refills   • levothyroxine (SYNTHROID, LEVOTHROID) 25 MCG tablet 90 tablet 1     Sig: Take 1 tablet by mouth Daily.        Pharmacy where request should be sent: [id number] - [place], [place] - [address] [facility] AT [facility] OF [address]/[facility] & [place] - [phone] [facility] [phone] FX     Additional details provided by patient: LAST DOSE TAKEN 7.22.22. PATIENT STATES HIS MY CHART TELLS HIM TO TAKE THIS MEDICATION TWICE A DAY AND THIS IS WHY HE HAS RAN OUT SO SOON.   PATIENT STATES HE NEEDS THE PRESCRIPTION WRITTEN TO SAY TWICE DAILY.     Does the patient have less than a 3 day supply:  [x] Yes  [] No    Obed Epps Rep   07/26/22 11:35 EDT

## 2022-07-26 NOTE — TELEPHONE ENCOUNTER
I had made note to take levothyroxine 2 tablets once a day since he had the 25 mcg dose.  He is supposed to be taking 50 mcg of levothyroxine.  I will send in the 50 mcg tablet.  He needs to have his labs rechecked.  Lab order is already in.

## 2022-08-15 ENCOUNTER — TELEPHONE (OUTPATIENT)
Dept: FAMILY MEDICINE CLINIC | Facility: CLINIC | Age: 42
End: 2022-08-15

## 2022-08-15 NOTE — TELEPHONE ENCOUNTER
Called pt to reschedule his appt. He is on Buck's schedule on 8/17/22 for a follow up visit. A follow up visit should be scheduled with his pcp which is Debora.

## 2022-08-16 ENCOUNTER — TELEPHONE (OUTPATIENT)
Dept: FAMILY MEDICINE CLINIC | Facility: CLINIC | Age: 42
End: 2022-08-16

## 2022-08-16 NOTE — TELEPHONE ENCOUNTER
JEFF TO READ:  Lm letting pt know he has an appt tomorrow with Natalia and that needs to be r/ to his pcp ADAN Cazares

## 2022-08-18 ENCOUNTER — TELEPHONE (OUTPATIENT)
Dept: FAMILY MEDICINE CLINIC | Facility: CLINIC | Age: 42
End: 2022-08-18

## 2022-08-18 NOTE — TELEPHONE ENCOUNTER
Patient missed appointment on 08/17/2022 @ 0815 with Natalia Buck. Spoke to patient. Stated he forgot his appointment. Rescheduled appointment and explained policy concerning missed appointments and same day cancellations

## 2022-08-31 DIAGNOSIS — E03.9 ACQUIRED HYPOTHYROIDISM: ICD-10-CM

## 2022-09-01 RX ORDER — LEVOTHYROXINE SODIUM 0.03 MG/1
25 TABLET ORAL DAILY
Qty: 90 TABLET | Refills: 1 | OUTPATIENT
Start: 2022-09-01

## 2022-09-21 ENCOUNTER — TELEPHONE (OUTPATIENT)
Dept: FAMILY MEDICINE CLINIC | Facility: CLINIC | Age: 42
End: 2022-09-21

## 2022-09-22 ENCOUNTER — TELEPHONE (OUTPATIENT)
Dept: FAMILY MEDICINE CLINIC | Facility: CLINIC | Age: 42
End: 2022-09-22

## 2022-09-22 NOTE — TELEPHONE ENCOUNTER
Patient has had multiple missed appointments. Provider, Sharath Cazares would like to give this patient another chance. Called patient, no answer, left message explaining policy concerning missed appointments and same day cancellations. Informed also of providers decision.

## 2022-11-15 DIAGNOSIS — I10 ESSENTIAL HYPERTENSION: ICD-10-CM

## 2022-11-15 RX ORDER — AMLODIPINE BESYLATE 5 MG/1
5 TABLET ORAL DAILY
Qty: 90 TABLET | Refills: 1 | Status: SHIPPED | OUTPATIENT
Start: 2022-11-15

## 2022-11-22 ENCOUNTER — APPOINTMENT (OUTPATIENT)
Dept: CT IMAGING | Facility: HOSPITAL | Age: 42
End: 2022-11-22

## 2022-12-05 ENCOUNTER — TELEPHONE (OUTPATIENT)
Dept: UROLOGY | Facility: CLINIC | Age: 42
End: 2022-12-05

## 2022-12-05 DIAGNOSIS — C64.1 RENAL CANCER, RIGHT: ICD-10-CM

## 2022-12-05 DIAGNOSIS — C64.2 MALIGNANT NEOPLASM OF LEFT KIDNEY: Primary | ICD-10-CM

## 2022-12-05 NOTE — TELEPHONE ENCOUNTER
CALLED PT TO FIND OUT IF PT HAD CT RS/HE HAD NOT/SENT MESSAGE TO [facility] MEDICAL STAFF TO PUT IN NEW ORDER FOR CT AND WILL WATCH AND CALL PT ONCE CT IS SCHEDULED TO SCHEDULE F/U APPT W/NOEMI

## 2022-12-18 ENCOUNTER — APPOINTMENT (OUTPATIENT)
Dept: CT IMAGING | Facility: HOSPITAL | Age: 42
DRG: 372 | End: 2022-12-18
Payer: COMMERCIAL

## 2022-12-18 ENCOUNTER — HOSPITAL ENCOUNTER (INPATIENT)
Facility: HOSPITAL | Age: 42
LOS: 3 days | Discharge: HOME | DRG: 372 | End: 2022-12-21
Attending: EMERGENCY MEDICINE | Admitting: STUDENT IN AN ORGANIZED HEALTH CARE EDUCATION/TRAINING PROGRAM
Payer: COMMERCIAL

## 2022-12-18 DIAGNOSIS — R10.84 GENERALIZED ABDOMINAL PAIN: ICD-10-CM

## 2022-12-18 DIAGNOSIS — D72.829 LEUKOCYTOSIS, UNSPECIFIED TYPE: ICD-10-CM

## 2022-12-18 DIAGNOSIS — K51.00 PANCOLITIS: Primary | ICD-10-CM

## 2022-12-18 LAB
027 TOXIN: NORMAL
ALBUMIN SERPL-MCNC: 4.2 G/DL (ref 3.5–5.2)
ALBUMIN/GLOB SERPL: 1.2 G/DL
ALP SERPL-CCNC: 110 U/L (ref 39–117)
ALT SERPL W P-5'-P-CCNC: 16 U/L (ref 1–41)
ANION GAP SERPL CALCULATED.3IONS-SCNC: 10.1 MMOL/L (ref 5–15)
AST SERPL-CCNC: 16 U/L (ref 1–40)
BACTERIA UR QL AUTO: ABNORMAL /HPF
BASOPHILS # BLD AUTO: 0.06 10*3/MM3 (ref 0–0.2)
BASOPHILS NFR BLD AUTO: 0.3 % (ref 0–1.5)
BILIRUB SERPL-MCNC: 0.4 MG/DL (ref 0–1.2)
BILIRUB UR QL STRIP: NEGATIVE
BUN SERPL-MCNC: 7 MG/DL (ref 6–20)
BUN/CREAT SERPL: 5.6 (ref 7–25)
C DIFF TOX GENS STL QL NAA+PROBE: NEGATIVE
CALCIUM SPEC-SCNC: 9.2 MG/DL (ref 8.6–10.5)
CHLORIDE SERPL-SCNC: 98 MMOL/L (ref 98–107)
CLARITY UR: CLEAR
CO2 SERPL-SCNC: 24.9 MMOL/L (ref 22–29)
COLOR UR: YELLOW
CREAT SERPL-MCNC: 1.25 MG/DL (ref 0.76–1.27)
D-LACTATE SERPL-SCNC: 0.7 MMOL/L (ref 0.5–2)
DEPRECATED RDW RBC AUTO: 39.1 FL (ref 37–54)
EGFRCR SERPLBLD CKD-EPI 2021: 73.7 ML/MIN/1.73
EOSINOPHIL # BLD AUTO: 0.03 10*3/MM3 (ref 0–0.4)
EOSINOPHIL NFR BLD AUTO: 0.2 % (ref 0.3–6.2)
ERYTHROCYTE [DISTWIDTH] IN BLOOD BY AUTOMATED COUNT: 11.9 % (ref 12.3–15.4)
GLOBULIN UR ELPH-MCNC: 3.4 GM/DL
GLUCOSE SERPL-MCNC: 118 MG/DL (ref 65–99)
GLUCOSE UR STRIP-MCNC: NEGATIVE MG/DL
HCT VFR BLD AUTO: 43.2 % (ref 37.5–51)
HEMOCCULT STL QL IA: POSITIVE
HGB BLD-MCNC: 14.8 G/DL (ref 13–17.7)
HGB UR QL STRIP.AUTO: ABNORMAL
HOLD SPECIMEN: NORMAL
HOLD SPECIMEN: NORMAL
HYALINE CASTS UR QL AUTO: ABNORMAL /LPF
IMM GRANULOCYTES # BLD AUTO: 0.07 10*3/MM3 (ref 0–0.05)
IMM GRANULOCYTES NFR BLD AUTO: 0.4 % (ref 0–0.5)
KETONES UR QL STRIP: NEGATIVE
LEUKOCYTE ESTERASE UR QL STRIP.AUTO: NEGATIVE
LIPASE SERPL-CCNC: 17 U/L (ref 13–60)
LYMPHOCYTES # BLD AUTO: 1.55 10*3/MM3 (ref 0.7–3.1)
LYMPHOCYTES NFR BLD AUTO: 7.9 % (ref 19.6–45.3)
MCH RBC QN AUTO: 30.7 PG (ref 26.6–33)
MCHC RBC AUTO-ENTMCNC: 34.3 G/DL (ref 31.5–35.7)
MCV RBC AUTO: 89.6 FL (ref 79–97)
MONOCYTES # BLD AUTO: 1.83 10*3/MM3 (ref 0.1–0.9)
MONOCYTES NFR BLD AUTO: 9.3 % (ref 5–12)
NEUTROPHILS NFR BLD AUTO: 16.09 10*3/MM3 (ref 1.7–7)
NEUTROPHILS NFR BLD AUTO: 81.9 % (ref 42.7–76)
NITRITE UR QL STRIP: NEGATIVE
NRBC BLD AUTO-RTO: 0 /100 WBC (ref 0–0.2)
PH UR STRIP.AUTO: 6.5 [PH] (ref 5–8)
PLATELET # BLD AUTO: 278 10*3/MM3 (ref 140–450)
PMV BLD AUTO: 8.2 FL (ref 6–12)
POTASSIUM SERPL-SCNC: 4.2 MMOL/L (ref 3.5–5.2)
PROT SERPL-MCNC: 7.6 G/DL (ref 6–8.5)
PROT UR QL STRIP: ABNORMAL
RBC # BLD AUTO: 4.82 10*6/MM3 (ref 4.14–5.8)
RBC # UR STRIP: ABNORMAL /HPF
REF LAB TEST METHOD: ABNORMAL
SODIUM SERPL-SCNC: 133 MMOL/L (ref 136–145)
SP GR UR STRIP: >1.03 (ref 1–1.03)
SQUAMOUS #/AREA URNS HPF: ABNORMAL /HPF
UROBILINOGEN UR QL STRIP: ABNORMAL
WBC # UR STRIP: ABNORMAL /HPF
WBC NRBC COR # BLD: 19.63 10*3/MM3 (ref 3.4–10.8)
WHOLE BLOOD HOLD COAG: NORMAL
WHOLE BLOOD HOLD SPECIMEN: NORMAL

## 2022-12-18 PROCEDURE — 83605 ASSAY OF LACTIC ACID: CPT | Performed by: EMERGENCY MEDICINE

## 2022-12-18 PROCEDURE — 74177 CT ABD & PELVIS W/CONTRAST: CPT

## 2022-12-18 PROCEDURE — 0 IOPAMIDOL PER 1 ML: Performed by: EMERGENCY MEDICINE

## 2022-12-18 PROCEDURE — 25010000002 PIPERACILLIN SOD-TAZOBACTAM PER 1 G: Performed by: EMERGENCY MEDICINE

## 2022-12-18 PROCEDURE — 87505 NFCT AGENT DETECTION GI: CPT | Performed by: EMERGENCY MEDICINE

## 2022-12-18 PROCEDURE — 82274 ASSAY TEST FOR BLOOD FECAL: CPT | Performed by: EMERGENCY MEDICINE

## 2022-12-18 PROCEDURE — 36415 COLL VENOUS BLD VENIPUNCTURE: CPT

## 2022-12-18 PROCEDURE — 83690 ASSAY OF LIPASE: CPT

## 2022-12-18 PROCEDURE — 25010000002 MORPHINE PER 10 MG: Performed by: STUDENT IN AN ORGANIZED HEALTH CARE EDUCATION/TRAINING PROGRAM

## 2022-12-18 PROCEDURE — 99222 1ST HOSP IP/OBS MODERATE 55: CPT | Performed by: STUDENT IN AN ORGANIZED HEALTH CARE EDUCATION/TRAINING PROGRAM

## 2022-12-18 PROCEDURE — 25010000002 HYDROMORPHONE 1 MG/ML SOLUTION: Performed by: EMERGENCY MEDICINE

## 2022-12-18 PROCEDURE — 99284 EMERGENCY DEPT VISIT MOD MDM: CPT

## 2022-12-18 PROCEDURE — 80053 COMPREHEN METABOLIC PANEL: CPT

## 2022-12-18 PROCEDURE — 81001 URINALYSIS AUTO W/SCOPE: CPT | Performed by: EMERGENCY MEDICINE

## 2022-12-18 PROCEDURE — 87493 C DIFF AMPLIFIED PROBE: CPT | Performed by: EMERGENCY MEDICINE

## 2022-12-18 PROCEDURE — 85025 COMPLETE CBC W/AUTO DIFF WBC: CPT

## 2022-12-18 RX ORDER — AMLODIPINE BESYLATE 5 MG/1
5 TABLET ORAL DAILY
Status: DISCONTINUED | OUTPATIENT
Start: 2022-12-19 | End: 2022-12-21 | Stop reason: HOSPADM

## 2022-12-18 RX ORDER — NITROGLYCERIN 0.4 MG/1
0.4 TABLET SUBLINGUAL
Status: DISCONTINUED | OUTPATIENT
Start: 2022-12-18 | End: 2022-12-21 | Stop reason: HOSPADM

## 2022-12-18 RX ORDER — SODIUM CHLORIDE 0.9 % (FLUSH) 0.9 %
10 SYRINGE (ML) INJECTION EVERY 12 HOURS SCHEDULED
Status: DISCONTINUED | OUTPATIENT
Start: 2022-12-18 | End: 2022-12-21 | Stop reason: HOSPADM

## 2022-12-18 RX ORDER — LEVOTHYROXINE SODIUM 0.05 MG/1
50 TABLET ORAL
Status: DISCONTINUED | OUTPATIENT
Start: 2022-12-19 | End: 2022-12-21 | Stop reason: HOSPADM

## 2022-12-18 RX ORDER — CEFTRIAXONE SODIUM 2 G/50ML
2 INJECTION, SOLUTION INTRAVENOUS EVERY 24 HOURS
Status: DISCONTINUED | OUTPATIENT
Start: 2022-12-19 | End: 2022-12-21 | Stop reason: HOSPADM

## 2022-12-18 RX ORDER — MORPHINE SULFATE 2 MG/ML
2 INJECTION, SOLUTION INTRAMUSCULAR; INTRAVENOUS
Status: DISCONTINUED | OUTPATIENT
Start: 2022-12-18 | End: 2022-12-19

## 2022-12-18 RX ORDER — IPRATROPIUM BROMIDE AND ALBUTEROL SULFATE 2.5; .5 MG/3ML; MG/3ML
3 SOLUTION RESPIRATORY (INHALATION) EVERY 6 HOURS PRN
Status: DISCONTINUED | OUTPATIENT
Start: 2022-12-18 | End: 2022-12-21 | Stop reason: HOSPADM

## 2022-12-18 RX ORDER — KETOROLAC TROMETHAMINE 30 MG/ML
30 INJECTION, SOLUTION INTRAMUSCULAR; INTRAVENOUS ONCE
Status: DISCONTINUED | OUTPATIENT
Start: 2022-12-18 | End: 2022-12-19

## 2022-12-18 RX ORDER — SODIUM CHLORIDE 9 MG/ML
40 INJECTION, SOLUTION INTRAVENOUS AS NEEDED
Status: DISCONTINUED | OUTPATIENT
Start: 2022-12-18 | End: 2022-12-21 | Stop reason: HOSPADM

## 2022-12-18 RX ORDER — METRONIDAZOLE 500 MG/100ML
500 INJECTION, SOLUTION INTRAVENOUS EVERY 8 HOURS
Status: DISCONTINUED | OUTPATIENT
Start: 2022-12-19 | End: 2022-12-20

## 2022-12-18 RX ORDER — SODIUM CHLORIDE 0.9 % (FLUSH) 0.9 %
10 SYRINGE (ML) INJECTION AS NEEDED
Status: DISCONTINUED | OUTPATIENT
Start: 2022-12-18 | End: 2022-12-21 | Stop reason: HOSPADM

## 2022-12-18 RX ADMIN — SODIUM CHLORIDE 1000 ML: 9 INJECTION, SOLUTION INTRAVENOUS at 17:31

## 2022-12-18 RX ADMIN — IOPAMIDOL 100 ML: 755 INJECTION, SOLUTION INTRAVENOUS at 17:57

## 2022-12-18 RX ADMIN — HYDROMORPHONE HYDROCHLORIDE 0.5 MG: 1 INJECTION, SOLUTION INTRAMUSCULAR; INTRAVENOUS; SUBCUTANEOUS at 20:48

## 2022-12-18 RX ADMIN — MORPHINE SULFATE 2 MG: 2 INJECTION, SOLUTION INTRAMUSCULAR; INTRAVENOUS at 23:00

## 2022-12-18 RX ADMIN — PIPERACILLIN SODIUM AND TAZOBACTAM SODIUM 4.5 G: 4; .5 INJECTION, POWDER, LYOPHILIZED, FOR SOLUTION INTRAVENOUS at 18:53

## 2022-12-18 RX ADMIN — Medication 10 ML: at 23:01

## 2022-12-18 NOTE — ED PROVIDER NOTES
Time: 4:49 PM EST  Chief Complaint:   Chief Complaint   Patient presents with   • Abdominal Pain           History of Present Illness:  Patient is a 42 y.o. year old male who presents to the emergency department with left lower quadrant abdominal pain.  Patient states pain began approximately a week ago.  It has progressively gotten worse in nature.  Patient describes it as sharp in nature.  Patient is he has had some radiation to his low back as well.  Patient is the pain is worse with movement.  Patient states nothing is made the pain better.  Patient also states that he is having profuse  diarrhea.  Patient states it is mostly water with flecks of black and red in it.  Patient also reports having a subjective fever over the last couple of days.  Patient has had decreased p.o. intake.      HPI    Patient Care Team  Primary Care Provider: Dawit Cazares APRN    Past Medical History:     No Known Allergies  Past Medical History:   Diagnosis Date   • Adrenal mass (HCC)    • Allergies    • Anxiety    • Asthma    • Bipolar 1 disorder (HCC)    • Broken bones    • Depression    • Emphysema/COPD (HCC)    • Head injury    • Heart murmur    • High blood pressure    • Lumbago 11/17/2015   • Lumbar spinal stenosis 11/17/2015    L4-5   • Renal cancer (HCC) 05/17/2021   • Renal mass    • Skin disease    • SOB (shortness of breath)      Past Surgical History:   Procedure Laterality Date   • HAND SURGERY     • LEG SURGERY     • LUMBAR LAMINECTOMY  12/07/2015    L4-5   • NEPHRECTOMY      Right kidney    • VENTRAL HERNIA REPAIR N/A 6/15/2022    Procedure: VENTRAL Incisional HERNIA REPAIR LAPAROSCOPIC WITH DAVINCI ROBOT with mesh;  Surgeon: Tay Ash MD;  Location: [facility];  Service: Robotics - DaVinci;  Laterality: N/A;     Family History   Problem Relation Age of Onset   • Diabetes Paternal Grandmother    • Heart attack Paternal Grandmother    • Malig Hyperthermia Neg Hx        Home Medications:  Prior to  Admission medications    Medication Sig Start Date End Date Taking? Authorizing Provider   amLODIPine (NORVASC) 5 MG tablet TAKE 1 TABLET BY MOUTH DAILY 11/15/22   Dawit Cazares APRN   clobetasol (TEMOVATE) 0.05 % cream Apply 1 application topically to the appropriate area as directed 2 (Two) Times a Day. 6/7/22   Dawit Cazares APRN   HYDROcodone-acetaminophen (Norco) 5-325 MG per tablet Take 1 tablet by mouth Every 6 (Six) Hours As Needed for Mild Pain . 6/15/22   Tay Ash MD   hydrocortisone 2.5 % cream Apply 1 application topically to the appropriate area as directed 2 (Two) Times a Day. 6/7/22   Dawit Cazares APRN   levothyroxine (SYNTHROID, LEVOTHROID) 50 MCG tablet Take 1 tablet by mouth Daily. 7/26/22   Dawit Cazares APRN   nicotine (NICODERM CQ) 21 MG/24HR patch Place 1 patch on the skin as directed by provider Daily. 6/8/22   Tay Ash MD   nicotine polacrilex (NICORETTE) 4 MG gum Chew 1 each As Needed for Smoking Cessation. 7/13/22   Tay Ash MD   polyethylene glycol (MIRALAX) 17 g packet Take 17 g by mouth Daily. 6/15/22   Tay Ash MD   traMADol (Ultram) 50 MG tablet Take 1 tablet by mouth Every 6 (Six) Hours As Needed for Moderate Pain . 6/21/22 6/21/23  Tay Ash MD        Social History:   Social History     Tobacco Use   • Smoking status: Every Day     Packs/day: 0.50     Years: 25.00     Pack years: 12.50     Types: Cigarettes   • Smokeless tobacco: Never   • Tobacco comments:     down to 1 cig a day   Vaping Use   • Vaping Use: Never used   Substance Use Topics   • Alcohol use: Yes     Comment: Occasionally    • Drug use: Never         Review of Systems:  Review of Systems   Constitutional: Positive for fever. Negative for chills.   HENT: Negative for congestion, ear pain and sore throat.    Eyes: Negative for pain.   Respiratory: Negative for cough, chest tightness and shortness of breath.    Cardiovascular: Negative  for chest pain.   Gastrointestinal: Positive for abdominal pain and diarrhea. Negative for blood in stool, nausea and vomiting.   Genitourinary: Negative for flank pain and hematuria.   Musculoskeletal: Negative for joint swelling.   Skin: Negative for pallor.   Neurological: Negative for seizures and headaches.   All other systems reviewed and are negative.       Physical Exam:  /97 (BP Location: Left arm, Patient Position: Sitting) Comment: nurse aware  Pulse 81   Temp 97.3 °F (36.3 °C) (Oral)   Resp 18   Ht 188 cm (74\")   Wt 87.8 kg (193 lb 9 oz)   SpO2 95%   BMI 24.85 kg/m²     Physical Exam  Vitals and nursing note reviewed.   Constitutional:       General: He is not in acute distress.     Appearance: Normal appearance. He is not toxic-appearing.   HENT:      Head: Normocephalic and atraumatic.      Mouth/Throat:      Mouth: Mucous membranes are moist.   Eyes:      General: No scleral icterus.     Extraocular Movements: Extraocular movements intact.      Conjunctiva/sclera: Conjunctivae normal.   Cardiovascular:      Rate and Rhythm: Normal rate and regular rhythm.      Pulses: Normal pulses.      Heart sounds: Normal heart sounds.   Pulmonary:      Effort: Pulmonary effort is normal. No respiratory distress.      Breath sounds: Normal breath sounds.   Abdominal:      General: Abdomen is flat. Bowel sounds are normal. There is no distension.      Palpations: Abdomen is soft.      Tenderness: There is abdominal tenderness in the suprapubic area. There is guarding. There is no right CVA tenderness or left CVA tenderness.   Musculoskeletal:         General: Normal range of motion.      Cervical back: Normal range of motion and neck supple.   Skin:     General: Skin is warm and dry.      Coloration: Skin is not cyanotic.   Neurological:      Mental Status: He is alert and oriented to person, place, and time. Mental status is at baseline.   Psychiatric:         Attention and Perception: Attention and  perception normal.         Mood and Affect: Mood normal.                Medications in the Emergency Department:  Medications   sodium chloride 0.9 % flush 10 mL (has no administration in time range)   ketorolac (TORADOL) injection 30 mg (30 mg Intravenous Not Given 12/18/22 1823)   levothyroxine (SYNTHROID, LEVOTHROID) tablet 50 mcg (has no administration in time range)   amLODIPine (NORVASC) tablet 5 mg (has no administration in time range)   nitroglycerin (NITROSTAT) SL tablet 0.4 mg (has no administration in time range)   sodium chloride 0.9 % flush 10 mL (has no administration in time range)   sodium chloride 0.9 % flush 10 mL (10 mL Intravenous Given 12/18/22 2301)   sodium chloride 0.9 % infusion 40 mL (has no administration in time range)   cefTRIAXone (ROCEPHIN) IVPB 2 g (has no administration in time range)   metroNIDAZOLE (FLAGYL) IVPB 500 mg (has no administration in time range)   morphine injection 2 mg (2 mg Intravenous Given 12/18/22 2300)   ipratropium-albuterol (DUO-NEB) nebulizer solution 3 mL (has no administration in time range)   nicotine (NICODERM CQ) 7 MG/24HR patch 1 patch (has no administration in time range)   sodium chloride 0.9 % bolus 1,000 mL (0 mL Intravenous Stopped 12/18/22 1801)   iopamidol (ISOVUE-370) 76 % injection 100 mL (100 mL Intravenous Given 12/18/22 1757)   piperacillin-tazobactam (ZOSYN) 4.5 g/100 mL 0.9% NS IVPB (mbp) (0 g Intravenous Stopped 12/18/22 2216)   HYDROmorphone (DILAUDID) injection 0.5 mg (0.5 mg Intravenous Given 12/18/22 2048)        Labs  Lab Results (last 24 hours)     Procedure Component Value Units Date/Time    CBC & Differential [[id number]]  (Abnormal) Collected: 12/18/22 1648    Specimen: Blood Updated: 12/18/22 1657    Narrative:      The following orders were created for panel order CBC & Differential.  Procedure                               Abnormality         Status                     ---------                               -----------          ------                     CBC Auto Differential[[id number]]        Abnormal            Final result                 Please view results for these tests on the individual orders.    Comprehensive Metabolic Panel [[id number]]  (Abnormal) Collected: 12/18/22 1648    Specimen: Blood Updated: 12/18/22 1712     Glucose 118 mg/dL      BUN 7 mg/dL      Creatinine 1.25 mg/dL      Sodium 133 mmol/L      Potassium 4.2 mmol/L      Chloride 98 mmol/L      CO2 24.9 mmol/L      Calcium 9.2 mg/dL      Total Protein 7.6 g/dL      Albumin 4.20 g/dL      ALT (SGPT) 16 U/L      AST (SGOT) 16 U/L      Alkaline Phosphatase 110 U/L      Total Bilirubin 0.4 mg/dL      Globulin 3.4 gm/dL      A/G Ratio 1.2 g/dL      BUN/Creatinine Ratio 5.6     Anion Gap 10.1 mmol/L      eGFR 73.7 mL/min/1.73      Comment: National Kidney Foundation and American Society of Nephrology (ASN) Task Force recommended calculation based on the Chronic Kidney Disease Epidemiology Collaboration (CKD-EPI) equation refit without adjustment for race.       Narrative:      GFR Normal >60  Chronic Kidney Disease <60  Kidney Failure <15      Lipase [[id number]]  (Normal) Collected: 12/18/22 1648    Specimen: Blood Updated: 12/18/22 1712     Lipase 17 U/L     CBC Auto Differential [[id number]]  (Abnormal) Collected: 12/18/22 1648    Specimen: Blood Updated: 12/18/22 1657     WBC 19.63 10*3/mm3      RBC 4.82 10*6/mm3      Hemoglobin 14.8 g/dL      Hematocrit 43.2 %      MCV 89.6 fL      MCH 30.7 pg      MCHC 34.3 g/dL      RDW 11.9 %      RDW-SD 39.1 fl      MPV 8.2 fL      Platelets 278 10*3/mm3      Neutrophil % 81.9 %      Lymphocyte % 7.9 %      Monocyte % 9.3 %      Eosinophil % 0.2 %      Basophil % 0.3 %      Immature Grans % 0.4 %      Neutrophils, Absolute 16.09 10*3/mm3      Lymphocytes, Absolute 1.55 10*3/mm3      Monocytes, Absolute 1.83 10*3/mm3      Eosinophils, Absolute 0.03 10*3/mm3      Basophils, Absolute 0.06 10*3/mm3      Immature Grans, Absolute 0.07  10*3/mm3      nRBC 0.0 /100 WBC     Urinalysis With Microscopic If Indicated (No Culture) - Urine, Clean Catch [[id number]]  (Abnormal) Collected: 12/18/22 1936    Specimen: Urine, Clean Catch Updated: 12/18/22 2004     Color, UA Yellow     Appearance, UA Clear     pH, UA 6.5     Specific Gravity, UA >1.030     Glucose, UA Negative     Ketones, UA Negative     Bilirubin, UA Negative     Blood, UA Trace     Protein, UA Trace     Leuk Esterase, UA Negative     Nitrite, UA Negative     Urobilinogen, UA 0.2 E.U./dL    Clostridioides difficile Toxin - Stool, Per Rectum [[id number]] Collected: 12/18/22 1936    Specimen: Stool from Per Rectum Updated: 12/18/22 2103    Narrative:      The following orders were created for panel order Clostridioides difficile Toxin - Stool, Per Rectum.  Procedure                               Abnormality         Status                     ---------                               -----------         ------                     Clostridioides difficile...[[id number]]                      Final result                 Please view results for these tests on the individual orders.    Enteric Bacterial Panel - Stool, Per Rectum [[id number]] Collected: 12/18/22 1936    Specimen: Stool from Per Rectum Updated: 12/18/22 1947    Clostridioides difficile Toxin, PCR - Stool, Per Rectum [[id number]] Collected: 12/18/22 1936    Specimen: Stool from Per Rectum Updated: 12/18/22 2103     C. Difficile Toxins by PCR Negative     027 Toxin Presumptive Negative    Occult Blood, Fecal By Immunoassay - Stool, Per Rectum [[id number]]  (Abnormal) Collected: 12/18/22 1936    Specimen: Stool from Per Rectum Updated: 12/18/22 2029     Occult Blood, Fecal by Immunoassay Positive    Urinalysis, Microscopic Only - Urine, Clean Catch [[id number]]  (Abnormal) Collected: 12/18/22 1936    Specimen: Urine, Clean Catch Updated: 12/18/22 2004     RBC, UA 6-12 /HPF      WBC, UA 0-2 /HPF      Bacteria, UA None Seen /HPF      Squamous  Epithelial Cells, UA 0-2 /HPF      Hyaline Casts, UA None Seen /LPF      Methodology Automated Microscopy    Lactic Acid, Plasma [[id number]]  (Normal) Collected: 12/18/22 2011    Specimen: Blood from Arm, Right Updated: 12/18/22 2035     Lactate 0.7 mmol/L            Imaging:  CT Abdomen Pelvis With Contrast    Result Date: 12/18/2022  PROCEDURE: CT ABDOMEN PELVIS W CONTRAST  COMPARISON: [facility], CT, ABDOMEN/PELVIS WITH CONTRAST, 4/15/2021, 11:45.  [facility], CT, CT ABDOMEN PELVIS W CONTRAST, 6/01/2022, 17:26.  INDICATIONS: LLQ abd pain  PROTOCOL:   Standard imaging protocol performed    RADIATION:   DLP: 474 mGy*cm   Automated exposure control was utilized to minimize radiation dose. CONTRAST: 100 cc Isovue 370 I.V. LABS:   eGFR: >60 ml/min/1.73m2  TECHNIQUE: Axial images of the abdomen and pelvis with intravenous contrast.  ABDOMEN:  The lung bases are clear.  The liver, spleen, pancreas and right adrenal gland are normal.  Stable 2.3 cm left adrenal adenoma.  There are no inflammatory changes around the gallbladder.  Prior right nephrectomy.  PELVIS:  No evidence of bowel obstruction, perforation or abscess.  The appendix and terminal ileum are normal.  There are areas of mild wall thickening with adjacent inflammatory change throughout the colon consistent with pancolitis.  The abdominal aorta has a normal caliber.  The mesenteric vessels are patent.  There is minimal pelvic free fluid.  The urinary bladder is normal.  L5 pars defects are present without evidence of anterolisthesis.  Prior ventral hernia repair.  IMPRESSION:  Pancolitis.  BERNICE ERICKSON MD       Electronically Signed and Approved By: BERNICE ERICKSON MD on 12/18/2022 at 18:12               Procedures:  Procedures    Progress  ED Course as of 12/19/22 0320   Sun Dec 18, 2022   1649 --- PROVIDER IN TRIAGE NOTE ---    The patient was evaluated my me, Reina Crews in triage. Orders were placed and the patient is  currently awaiting disposition.    [AJ]      ED Course User Index  [AJ] Reina Crews, JULIAN                            The patient was initially evaluated in the triage area where orders were placed. The patient was later dispositioned by Ayo Ragland DO.      The patient was advised to stay for completion of workup which includes but is not limited to communication of labs and radiological results, reassessment and plan. The patient was advised that leaving prior to disposition by a provider could result in critical findings that are not communicated to the patient.     Medical Decision Making:  [facility]   The patient was seen and evaluated the ED by me.  The above history and physical examination was performed as document.  Diagnostic data was obtained.  Results reviewed.  Discussed with the patient.  Patient's work-up shows that a pancolitis with significant leukocytosis.  Hospital service has been consult for primary admission.  Patient was given IV Zosyn.      The following orders were placed after triage and evaluation:  Orders Placed This Encounter   Procedures   • Clostridioides difficile Toxin - Stool, Per Rectum   • Enteric Bacterial Panel - Stool, Per Rectum   • Clostridioides difficile Toxin, PCR - Stool, Per Rectum   • CT Abdomen Pelvis With Contrast   • [place] Draw   • Comprehensive Metabolic Panel   • Lipase   • Urinalysis With Microscopic If Indicated (No Culture) - Urine, Clean Catch   • CBC Auto Differential   • Lactic Acid, Plasma   • Occult Blood, Fecal By Immunoassay - Stool, Per Rectum   • Urinalysis, Microscopic Only - Urine, Clean Catch   • Potassium   • Magnesium   • Troponin   • Blood Gas, Arterial -With Co-Ox Panel: Yes   • CBC Auto Differential   • Comprehensive Metabolic Panel   • Magnesium   • Phosphorus   • NPO Diet NPO Type: Sips with Meds, Ice Chips   • Undress & Gown   • Vital Signs   • Telemetry - Pulse Oximetry   • Notify Provider if ACLS Protocol Activated   • Intake & Output   •  Weigh Patient   • Oral Care   • Saline Lock & Maintain IV Access   • Place Sequential Compression Device   • Maintain Sequential Compression Device   • Discontinue Cardiac Monitoring   • Code Status and Medical Interventions:   • Hospitalist (on-call MD unless specified)   • Inpatient Case Management  Consult   • Oxygen Therapy- Nasal Cannula; Titrate for SPO2: 90% - 95%   • ECG 12 Lead Chest Pain   • Insert Peripheral IV   • Insert Peripheral IV   • Inpatient Admission   • CBC & Differential   • Green Top (Gel)   • Lavender Top   • Gold Top - SST   • Light Blue Top       Final diagnoses:   Pancolitis (HCC)   Leukocytosis, unspecified type          Disposition:  ED Disposition     ED Disposition   Decision to Admit    Condition   --    Comment   Level of Care: Med/Surg [1]   Diagnosis: Pancolitis (HCC) [413970]   Certification: I Certify That Inpatient Hospital Services Are Medically Necessary For Greater Than 2 Midnights               This medical record created using voice recognition software.           Mynor Aviles  12/18/22 1814       Ayo Ragland DO  12/19/22 [id number]

## 2022-12-19 LAB
ALBUMIN SERPL-MCNC: 3.8 G/DL (ref 3.5–5.2)
ALBUMIN/GLOB SERPL: 1.2 G/DL
ALP SERPL-CCNC: 177 U/L (ref 39–117)
ALT SERPL W P-5'-P-CCNC: 64 U/L (ref 1–41)
ANION GAP SERPL CALCULATED.3IONS-SCNC: 12.2 MMOL/L (ref 5–15)
AST SERPL-CCNC: 91 U/L (ref 1–40)
BASOPHILS # BLD AUTO: 0.05 10*3/MM3 (ref 0–0.2)
BASOPHILS NFR BLD AUTO: 0.3 % (ref 0–1.5)
BILIRUB SERPL-MCNC: 0.3 MG/DL (ref 0–1.2)
BUN SERPL-MCNC: 8 MG/DL (ref 6–20)
BUN/CREAT SERPL: 7.1 (ref 7–25)
CALCIUM SPEC-SCNC: 8.8 MG/DL (ref 8.6–10.5)
CHLORIDE SERPL-SCNC: 102 MMOL/L (ref 98–107)
CO2 SERPL-SCNC: 20.8 MMOL/L (ref 22–29)
CREAT SERPL-MCNC: 1.12 MG/DL (ref 0.76–1.27)
DEPRECATED RDW RBC AUTO: 38.6 FL (ref 37–54)
EGFRCR SERPLBLD CKD-EPI 2021: 84.1 ML/MIN/1.73
EOSINOPHIL # BLD AUTO: 0.07 10*3/MM3 (ref 0–0.4)
EOSINOPHIL NFR BLD AUTO: 0.4 % (ref 0.3–6.2)
ERYTHROCYTE [DISTWIDTH] IN BLOOD BY AUTOMATED COUNT: 11.9 % (ref 12.3–15.4)
GLOBULIN UR ELPH-MCNC: 3.3 GM/DL
GLUCOSE BLDC GLUCOMTR-MCNC: 114 MG/DL (ref 70–99)
GLUCOSE BLDC GLUCOMTR-MCNC: 90 MG/DL (ref 70–99)
GLUCOSE SERPL-MCNC: 108 MG/DL (ref 65–99)
HCT VFR BLD AUTO: 40.1 % (ref 37.5–51)
HGB BLD-MCNC: 14.1 G/DL (ref 13–17.7)
IMM GRANULOCYTES # BLD AUTO: 0.12 10*3/MM3 (ref 0–0.05)
IMM GRANULOCYTES NFR BLD AUTO: 0.7 % (ref 0–0.5)
LYMPHOCYTES # BLD AUTO: 1.85 10*3/MM3 (ref 0.7–3.1)
LYMPHOCYTES NFR BLD AUTO: 11.5 % (ref 19.6–45.3)
MAGNESIUM SERPL-MCNC: 2 MG/DL (ref 1.6–2.6)
MCH RBC QN AUTO: 31.3 PG (ref 26.6–33)
MCHC RBC AUTO-ENTMCNC: 35.2 G/DL (ref 31.5–35.7)
MCV RBC AUTO: 89.1 FL (ref 79–97)
MONOCYTES # BLD AUTO: 1.93 10*3/MM3 (ref 0.1–0.9)
MONOCYTES NFR BLD AUTO: 12 % (ref 5–12)
NEUTROPHILS NFR BLD AUTO: 12.1 10*3/MM3 (ref 1.7–7)
NEUTROPHILS NFR BLD AUTO: 75.1 % (ref 42.7–76)
NRBC BLD AUTO-RTO: 0 /100 WBC (ref 0–0.2)
PHOSPHATE SERPL-MCNC: 3.3 MG/DL (ref 2.5–4.5)
PLATELET # BLD AUTO: 269 10*3/MM3 (ref 140–450)
PMV BLD AUTO: 8.8 FL (ref 6–12)
POTASSIUM SERPL-SCNC: 3.8 MMOL/L (ref 3.5–5.2)
PROT SERPL-MCNC: 7.1 G/DL (ref 6–8.5)
RBC # BLD AUTO: 4.5 10*6/MM3 (ref 4.14–5.8)
SODIUM SERPL-SCNC: 135 MMOL/L (ref 136–145)
WBC NRBC COR # BLD: 16.12 10*3/MM3 (ref 3.4–10.8)

## 2022-12-19 PROCEDURE — 25010000002 MORPHINE PER 10 MG: Performed by: STUDENT IN AN ORGANIZED HEALTH CARE EDUCATION/TRAINING PROGRAM

## 2022-12-19 PROCEDURE — 99233 SBSQ HOSP IP/OBS HIGH 50: CPT | Performed by: INTERNAL MEDICINE

## 2022-12-19 PROCEDURE — 94799 UNLISTED PULMONARY SVC/PX: CPT

## 2022-12-19 PROCEDURE — 25010000002 HYDROMORPHONE 1 MG/ML SOLUTION: Performed by: INTERNAL MEDICINE

## 2022-12-19 PROCEDURE — 83735 ASSAY OF MAGNESIUM: CPT | Performed by: STUDENT IN AN ORGANIZED HEALTH CARE EDUCATION/TRAINING PROGRAM

## 2022-12-19 PROCEDURE — 80053 COMPREHEN METABOLIC PANEL: CPT | Performed by: STUDENT IN AN ORGANIZED HEALTH CARE EDUCATION/TRAINING PROGRAM

## 2022-12-19 PROCEDURE — 84100 ASSAY OF PHOSPHORUS: CPT | Performed by: STUDENT IN AN ORGANIZED HEALTH CARE EDUCATION/TRAINING PROGRAM

## 2022-12-19 PROCEDURE — 0 CEFTRIAXONE PER 250 MG: Performed by: STUDENT IN AN ORGANIZED HEALTH CARE EDUCATION/TRAINING PROGRAM

## 2022-12-19 PROCEDURE — 82962 GLUCOSE BLOOD TEST: CPT

## 2022-12-19 PROCEDURE — 85025 COMPLETE CBC W/AUTO DIFF WBC: CPT | Performed by: STUDENT IN AN ORGANIZED HEALTH CARE EDUCATION/TRAINING PROGRAM

## 2022-12-19 PROCEDURE — 99222 1ST HOSP IP/OBS MODERATE 55: CPT | Performed by: INTERNAL MEDICINE

## 2022-12-19 RX ORDER — CHOLECALCIFEROL (VITAMIN D3) 125 MCG
10 CAPSULE ORAL NIGHTLY PRN
Status: DISCONTINUED | OUTPATIENT
Start: 2022-12-19 | End: 2022-12-21 | Stop reason: HOSPADM

## 2022-12-19 RX ORDER — SODIUM CHLORIDE, SODIUM LACTATE, POTASSIUM CHLORIDE, CALCIUM CHLORIDE 600; 310; 30; 20 MG/100ML; MG/100ML; MG/100ML; MG/100ML
75 INJECTION, SOLUTION INTRAVENOUS CONTINUOUS
Status: DISCONTINUED | OUTPATIENT
Start: 2022-12-19 | End: 2022-12-21

## 2022-12-19 RX ORDER — NALOXONE HCL 0.4 MG/ML
0.4 VIAL (ML) INJECTION
Status: DISCONTINUED | OUTPATIENT
Start: 2022-12-19 | End: 2022-12-21 | Stop reason: HOSPADM

## 2022-12-19 RX ADMIN — SODIUM CHLORIDE 40 ML: 9 INJECTION, SOLUTION INTRAVENOUS at 03:21

## 2022-12-19 RX ADMIN — LEVOTHYROXINE SODIUM 50 MCG: 50 TABLET ORAL at 05:38

## 2022-12-19 RX ADMIN — SODIUM CHLORIDE, POTASSIUM CHLORIDE, SODIUM LACTATE AND CALCIUM CHLORIDE 125 ML/HR: 600; 310; 30; 20 INJECTION, SOLUTION INTRAVENOUS at 12:39

## 2022-12-19 RX ADMIN — HYDROMORPHONE HYDROCHLORIDE 0.25 MG: 1 INJECTION, SOLUTION INTRAMUSCULAR; INTRAVENOUS; SUBCUTANEOUS at 15:00

## 2022-12-19 RX ADMIN — METRONIDAZOLE 500 MG: 500 INJECTION, SOLUTION INTRAVENOUS at 14:59

## 2022-12-19 RX ADMIN — Medication 10 MG: at 20:26

## 2022-12-19 RX ADMIN — METRONIDAZOLE 500 MG: 500 INJECTION, SOLUTION INTRAVENOUS at 20:26

## 2022-12-19 RX ADMIN — METRONIDAZOLE 500 MG: 500 INJECTION, SOLUTION INTRAVENOUS at 04:13

## 2022-12-19 RX ADMIN — SODIUM CHLORIDE, POTASSIUM CHLORIDE, SODIUM LACTATE AND CALCIUM CHLORIDE 125 ML/HR: 600; 310; 30; 20 INJECTION, SOLUTION INTRAVENOUS at 23:34

## 2022-12-19 RX ADMIN — Medication 10 ML: at 08:34

## 2022-12-19 RX ADMIN — MORPHINE SULFATE 2 MG: 2 INJECTION, SOLUTION INTRAMUSCULAR; INTRAVENOUS at 10:36

## 2022-12-19 RX ADMIN — AMLODIPINE BESYLATE 5 MG: 5 TABLET ORAL at 08:33

## 2022-12-19 RX ADMIN — HYDROMORPHONE HYDROCHLORIDE 0.5 MG: 1 INJECTION, SOLUTION INTRAMUSCULAR; INTRAVENOUS; SUBCUTANEOUS at 05:58

## 2022-12-19 RX ADMIN — CEFTRIAXONE SODIUM 2 G: 2 INJECTION, SOLUTION INTRAVENOUS at 03:21

## 2022-12-19 RX ADMIN — MORPHINE SULFATE 2 MG: 2 INJECTION, SOLUTION INTRAMUSCULAR; INTRAVENOUS at 03:28

## 2022-12-19 RX ADMIN — HYDROMORPHONE HYDROCHLORIDE 0.25 MG: 1 INJECTION, SOLUTION INTRAMUSCULAR; INTRAVENOUS; SUBCUTANEOUS at 20:26

## 2022-12-19 RX ADMIN — Medication 10 ML: at 20:26

## 2022-12-19 NOTE — H&P
[facility]  [facility]   Patient Name: Donte Stallings  : 1980  MRN: [id number]  Primary Care Physician:  Dawit Cazares APRN  Date of admission: 2022    Subjective   Subjective     Chief Complaint: Diarrhea, abdominal pain    HPI:    Donte Stallings is a 42 y.o. male with a past medical history of asthma, COPD presented to the ED with complaints of multiple episodes of loose stools since 5 days.  Associated with right lower quadrant and suprapubic abdominal pain.  Prior to the start of the diarrhea patient was on antibiotics for few days which she was taking for prophylaxis for a dental procedure.  Unsure which antibiotic it is.  Patient has used Imodium, Pepto-Bismol for the last 2 days to control the diarrhea.  Associated with fevers, chills, myalgias, nausea.  Noted intermittent tinges of black and bright red stools.  Denies any episodes of vomiting, chest pain, shortness of breath, headaches, weakness, numbness, tingling sensation, dysuria, weight loss.    In the ED, vital signs temperature 98.4, pulse 84, respiratory rate 18, blood pressure 122/89 on room air around 94%.  Labs showed sodium 133, normal creatinine, BUN, normal lactate, normal lipase, elevated WBC 19.63.  UA showed trace blood, trace protein, 6-12 RBC, negative nitrates, negative leukocytes.  C. difficile negative.  Enteric bacterial panel pending.  Stool for fecal occult positive.  CT abdomen pelvis with contrast showed pancolitis.  Received Zosyn in the ED and has been admitted for further evaluation and management of the pancolitis.      Personal History     Past Medical History:   Diagnosis Date   • Adrenal mass (HCC)    • Allergies    • Anxiety    • Asthma    • Bipolar 1 disorder (HCC)    • Broken bones    • Depression    • Emphysema/COPD (HCC)    • Head injury    • Heart murmur    • High blood pressure    • Lumbago 2015   • Lumbar spinal stenosis 2015    L4-5   • Renal  cancer (HCC) 05/17/2021   • Renal mass    • Skin disease    • SOB (shortness of breath)          Past Surgical History:   Procedure Laterality Date   • HAND SURGERY     • LEG SURGERY     • LUMBAR LAMINECTOMY  12/07/2015    L4-5   • NEPHRECTOMY      Right kidney    • VENTRAL HERNIA REPAIR N/A 6/15/2022    Procedure: VENTRAL Incisional HERNIA REPAIR LAPAROSCOPIC WITH DAVINCI ROBOT with mesh;  Surgeon: Tay Ash MD;  Location: [facility] MAIN OR;  Service: Robotics - DaVinci;  Laterality: N/A;         Family History   Problem Relation Age of Onset   • Diabetes Paternal Grandmother    • Heart attack Paternal Grandmother    • Malig Hyperthermia Neg Hx          Social History     Socioeconomic History   • Marital status:    Tobacco Use   • Smoking status: Every Day     Packs/day: 0.50     Years: 25.00     Pack years: 12.50     Types: Cigarettes   • Smokeless tobacco: Never   • Tobacco comments:     down to 1 cig a day   Vaping Use   • Vaping Use: Never used   Substance and Sexual Activity   • Alcohol use: Yes     Comment: Occasionally    • Drug use: Never   • Sexual activity: Defer         Home Medications:  HYDROcodone-acetaminophen, amLODIPine, clobetasol, hydrocortisone, levothyroxine, nicotine, nicotine polacrilex, polyethylene glycol, and traMADol    Allergies:  No Known Allergies    Review of Systems   All other systems reviewed and negative except as mentioned above in the HPI    Objective   Objective     Vitals:   Temp:  [98.4 °F (36.9 °C)] 98.4 °F (36.9 °C)  Heart Rate:  [73-89] 73  Resp:  [18] 18  BP: (122-145)/(89-90) 145/90    Physical Exam    Constitutional: Awake, alert, no acute distress   Eyes: Pupils equal, sclerae anicteric, no conjunctival injection   HENT: NCAT, mucous membranes moist   Neck: Supple, no thyromegaly, no lymphadenopathy, trachea midline   Respiratory: Clear to auscultation bilaterally, nonlabored respirations    Cardiovascular: RRR, no murmurs, rubs, or gallops, palpable  pedal pulses bilaterally   Gastrointestinal: Positive bowel sounds, soft, tenderness in the right lower quadrant in the suprapubic region, nondistended   Musculoskeletal: No bilateral ankle edema, no clubbing or cyanosis to extremities   Psychiatric: Appropriate affect, cooperative   Neurologic: Oriented x 3, strength symmetric in all extremities, Cranial Nerves grossly intact to confrontation, speech clear   Skin: No rashes     Result Review    Result Review:  I have personally reviewed the results from the time of this admission to 12/18/2022 22:22 EST and agree with these findings:  [x]  Laboratory  [x]  Microbiology  [x]  Radiology  [x]  EKG/Telemetry   []  Cardiology/Vascular   []  Pathology  []  Old records  []  Other:    Imaging Results (Last 24 Hours)     Procedure Component Value Units Date/Time    CT Abdomen Pelvis With Contrast [[id number]] Collected: 12/18/22 1812     Updated: 12/18/22 1816    Narrative:      PROCEDURE: CT ABDOMEN PELVIS W CONTRAST     COMPARISON: [facility], CT, ABDOMEN/PELVIS WITH CONTRAST, 4/15/2021, 11:45.  [facility], CT, CT ABDOMEN PELVIS W CONTRAST, 6/01/2022, 17:26.     INDICATIONS: LLQ abd pain     PROTOCOL:   Standard imaging protocol performed      RADIATION:   DLP: 474 mGy*cm    Automated exposure control was utilized to minimize radiation dose.   CONTRAST: 100 cc Isovue 370 I.V.  LABS:   eGFR: >60 ml/min/1.73m2     TECHNIQUE: Axial images of the abdomen and pelvis with intravenous contrast.     ABDOMEN:  The lung bases are clear.  The liver, spleen, pancreas and right adrenal gland are   normal.  Stable 2.3 cm left adrenal adenoma.  There are no inflammatory changes around the   gallbladder.  Prior right nephrectomy.     PELVIS:  No evidence of bowel obstruction, perforation or abscess.  The appendix and terminal ileum   are normal.  There are areas of mild wall thickening with adjacent inflammatory change throughout   the colon consistent with  pancolitis.  The abdominal aorta has a normal caliber.  The mesenteric   vessels are patent.  There is minimal pelvic free fluid.  The urinary bladder is normal.  L5 pars   defects are present without evidence of anterolisthesis.  Prior ventral hernia repair.     IMPRESSION:  Pancolitis.     BERNICE ERICKSON MD         Electronically Signed and Approved By: BERNICE ERICKSON MD on 12/18/2022 at 18:12                            Assessment & Plan   Assessment / Plan     Assessment/Plan:   Pancolitis  Severe diarrhea  COPD  Asthma  Tobacco use  Hypothyroidism  Hypertension    Plan  Admit to inpatient  Received Zosyn in the ED, switched to ceftriaxone and Flagyl  Follow-up on enteric bacterial panel  Did not use Imodium until enteric bacterial panel  IV morphine for pain control  DuoNebs as needed for wheezing, shortness of breath  Nicotine patch  Continue levothyroxine, amlodipine      DVT prophylaxis:  No DVT prophylaxis order currently exists.    CODE STATUS:    Level Of Support Discussed With: Patient  Code Status (Patient has no pulse and is not breathing): CPR (Attempt to Resuscitate)  Medical Interventions (Patient has pulse or is breathing): Full Support      Admission Status:  I believe this patient meets inpatient status.    Part of this note may be an electronic transcription/translation of spoken language to printed text using the Dragon Dictation System    Pete Ahmadi MD

## 2022-12-19 NOTE — ED NOTES
PATIENT'S SIGNIFICANT OTHER STATES THAT SHE IS LEAVING THE HOSPITAL FOR THE NIGHT AND TO CALL TO GIVE HER ANY PERTINENT UPDATES. SHE PROVIDED THE FOLLOWING PHONE NUMBERS:  [phone]

## 2022-12-19 NOTE — PROGRESS NOTES
Psychiatric   Hospitalist Progress Note  Date: 2022  Patient Name: Donte Stallings  : 1980  MRN: [id number]  Date of admission: 2022  Consultants:   -Gastroenterology: Dr. Mandy Lacy    Subjective   Subjective     Chief Complaint: Abdominal pain, diarrhea    Summary:   Donte Stallings is a 42 y.o. male with anxiety/depression, COPD/asthma, chronic ongoing tobacco abuse with cigarettes and hypothyroidism who presented to the ED with complaints of abdominal pain and diarrhea that have been ongoing for roughly 5 to 6 days prior to presentation.  Of note, patient prescribed amoxicillin as an outpatient for prophylaxis for dental procedure which she had been taking prior to onset of symptoms.  Eval in ED showed patient have elevated WBC.  CT abdomen/pelvis obtained and showed pancolitis.  Antibiotics initiated in the ED.  Hospitalist service contacted for further evaluation and management.    Interval Followup:   No acute events overnight.  Patient continues to have abdominal pain.  He has been receiving IV morphine for pain and notes minimal improvement.  Patient did receive IV Dilaudid in the ED and he says that this helped his pain a lot.  He states that he continues to have diarrhea and had roughly 8 bowel movements overnight.  He did not endorse any history of bowel issues.  He did have some nausea and dry heaving overnight but this is improved.  Denied chest pain or shortness of breath.  Nursing with no additional acute issues to report.    Antibiotics:   -Ceftriaxone  -Flagyl    Pain Medication:   -IV Dilaudid    Review of Systems   All systems reviewed and negative unless stated otherwise under subjective.    Objective   Objective     Vitals:   Temp:  [97.3 °F (36.3 °C)-98.4 °F (36.9 °C)] 98.2 °F (36.8 °C)  Heart Rate:  [67-89] 75  Resp:  [18-20] 18  BP: (122-155)/(82-97) 141/82  Physical Exam   Gen: Appears uncomfortable, conversant, pleasant, lying in bed  HEENT: MMM, Atraumatic  Neck:  Supple, Trachea midline  Resp: CTAB, No w/r/r, good aeration, equal chest rise bilaterally, no respiratory distress appreciated  Card: RRR, No m/r/g  Abd: Soft, diffuse tenderness to palpation, Nondistended, + bowel sounds  Ext: No cyanosis, No clubbing  Neuro: CN II-XII grossly intact, No focal deficits appreciated  Psych: AAO x 3, Normal mood, Normal affect    Result Review    Result Review:  I have personally reviewed the results as below and agree with these findings:  []  Laboratory:   CMP    CMP 6/1/22 12/18/22 12/19/22   Glucose 137 (A) 118 (A) 108 (A)   BUN 15 7 8   Creatinine 1.26 1.25 1.12   Sodium 137 133 (A) 135 (A)   Potassium 4.3 4.2 3.8   Chloride 104 98 102   Calcium 9.3 9.2 8.8   Albumin 4.50 4.20 3.80   Total Bilirubin 0.4 0.4 0.3   Alkaline Phosphatase 103 110 177 (A)   AST (SGOT) 16 16 91 (A)   ALT (SGPT) 18 16 64 (A)   (A) Abnormal value            CBC    CBC 6/1/22 12/18/22 12/19/22   WBC 10.65 19.63 (A) 16.12 (A)   RBC 5.04 4.82 4.50   Hemoglobin 15.4 14.8 14.1   Hematocrit 45.0 43.2 40.1   MCV 89.3 89.6 89.1   MCH 30.6 30.7 31.3   MCHC 34.2 34.3 35.2   RDW 12.0 (A) 11.9 (A) 11.9 (A)   Platelets 332 278 269   (A) Abnormal value            [x]  Microbiology: C. difficile toxin (12/18/2020): Negative  []  Radiology:   []  EKG/Telemetry:    []  Cardiology/Vascular:    []  Pathology:  []  Old records:  []  Other:    Assessment & Plan   Assessment / Plan     Assessment:  Pancolitis  Severe diarrhea  COPD/asthma, not acutely exacerbated  Chronic ongoing tobacco abuse with cigarettes  Hypothyroidism  Essential hypertension    Plan:  -Patient with continued significant diarrhea, Gastroenterology consulted  -Continue ceftriaxone and Flagyl  -Start IV fluids  -Follow-up results of infectious work-up and tailor management accordingly  -Pain management changed to as needed Dilaudid  -Start clear liquid diet and advance as tolerated  -Start appropriate home medications  -Will monitor electrolytes and renal  function with BMP and magnesium level in the AM  -Will monitor WBC and Hgb with CBC in the AM  -Clinical course will dictate further management     DVT Prophylaxis: SCDs  Diet: Clear liquid  Dispo: Home when medically appropriate for discharge  Code Status: Full code     Personally reviewed patients labs and imaging, discussed with patient and nurse at bedside. Discussed case with the following consultants: Gastroenterology.     Part of this note may be an electronic transcription/translation of spoken language to printed text using the Dragon dictation system.    DVT prophylaxis:  Mechanical DVT prophylaxis orders are present.    CODE STATUS:   Level Of Support Discussed With: Patient  Code Status (Patient has no pulse and is not breathing): CPR (Attempt to Resuscitate)  Medical Interventions (Patient has pulse or is breathing): Full Support        Electronically signed by Patrick Haider MD, 12/19/22, 1:03 PM EST.

## 2022-12-19 NOTE — PLAN OF CARE
Goal Outcome Evaluation:  Plan of Care Reviewed With: patient, spouse        Progress: no change  Outcome Evaluation: New ED admission this shift. Pt c/o pain/discomfort this shift, administered prn pain meds as ordered. Wife is currently at bedside. Pt's pain has improved throughout the night. No new issues or needs at this time.

## 2022-12-19 NOTE — CONSULTS
[facility]  Initial Inpatient Consult Note    Referring Provider: Dr. Haider    Reason for Consultation: colitis    Subjective     History of present illness:    42 y.o. male with history of COPD and bipolar 1 d/o who presents with diarrhea.  Pt reports symptoms started last Wednesday or Thursday with 10 - 12 bowel movements daily.  He reports some red blood initially with diarrhea but blood has resolved.  He reports lower abd cramping.  Subjective fever.  Recent stool studies positive for Salmonella.  Pt reports a new pet in the last month, a boa constrictor.    Past Medical History:  Past Medical History:   Diagnosis Date   • Adrenal mass (HCC)    • Allergies    • Anxiety    • Asthma    • Bipolar 1 disorder (HCC)    • Broken bones    • Depression    • Emphysema/COPD ([facility])    • Head injury    • Heart murmur    • High blood pressure    • Lumbago 11/17/2015   • Lumbar spinal stenosis 11/17/2015    L4-5   • Renal cancer (HCC) 05/17/2021   • Renal mass    • Skin disease    • SOB (shortness of breath)      Past Surgical History:  Past Surgical History:   Procedure Laterality Date   • HAND SURGERY     • LEG SURGERY     • LUMBAR LAMINECTOMY  12/07/2015    L4-5   • NEPHRECTOMY      Right kidney    • VENTRAL HERNIA REPAIR N/A 6/15/2022    Procedure: VENTRAL Incisional HERNIA REPAIR LAPAROSCOPIC WITH SocraticINCI ROBOT with mesh;  Surgeon: Tay Ash MD;  Location: [facility];  Service: Robotics - DaVinci;  Laterality: N/A;      Social History:   Social History     Tobacco Use   • Smoking status: Every Day     Packs/day: 0.50     Years: 25.00     Pack years: 12.50     Types: Cigarettes   • Smokeless tobacco: Never   • Tobacco comments:     down to 1 cig a day   Substance Use Topics   • Alcohol use: Yes     Comment: Occasionally       Family History:  Family History   Problem Relation Age of Onset   • Diabetes Paternal Grandmother    • Heart attack Paternal Grandmother    • Malig  Hyperthermia Neg Hx        Home Meds:  Medications Prior to Admission   Medication Sig Dispense Refill Last Dose   • amLODIPine (NORVASC) 5 MG tablet TAKE 1 TABLET BY MOUTH DAILY 90 tablet 1 12/18/2022   • levothyroxine (SYNTHROID, LEVOTHROID) 50 MCG tablet Take 1 tablet by mouth Daily. 90 tablet 3 12/18/2022     Current Meds:   amLODIPine, 5 mg, Oral, Daily  cefTRIAXone, 2 g, Intravenous, Q24H  levothyroxine, 50 mcg, Oral, Q AM  metroNIDAZOLE, 500 mg, Intravenous, Q8H  nicotine, 1 patch, Transdermal, Q24H  sodium chloride, 10 mL, Intravenous, Q12H      Allergies:  No Known Allergies  Review of Systems  Pertinent items are noted in HPI, all other systems reviewed and negative     Objective     Vital Signs  Temp:  [97.3 °F (36.3 °C)-98.4 °F (36.9 °C)] 98.2 °F (36.8 °C)  Heart Rate:  [67-89] 75  Resp:  [18-20] 18  BP: (122-155)/(82-97) 141/82  Physical Exam:  General Appearance:    Alert, cooperative, in no acute distress   Head:    Normocephalic, without obvious abnormality, atraumatic   Eyes:          conjunctivae and sclerae normal, no icterus   Throat:   no thrush, oral mucosa moist   Neck:   Supple, no adenopathy   Lungs:     Breathing unlabored    Heart:    No chest tenderness    Chest Wall:    No abnormalities observed   Abdomen:     Soft, nondistended, lower abd TTP   Extremities:   no edema, no redness   Skin:   No bruising or rash   Psychiatric:  normal mood and insight     Results Review:   I reviewed the patient's new clinical results.    Results from last 7 days   Lab Units 12/19/22  0507 12/18/22  1648   WBC 10*3/mm3 16.12* 19.63*   HEMOGLOBIN g/dL 14.1 14.8   HEMATOCRIT % 40.1 43.2   PLATELETS 10*3/mm3 269 278     Results from last 7 days   Lab Units 12/19/22  0507 12/18/22  1648   SODIUM mmol/L 135* 133*   POTASSIUM mmol/L 3.8 4.2   CHLORIDE mmol/L 102 98   CO2 mmol/L 20.8* 24.9   BUN mg/dL 8 7   CREATININE mg/dL 1.12 1.25   CALCIUM mg/dL 8.8 9.2   BILIRUBIN mg/dL 0.3 0.4   ALK PHOS U/L 177* 110   ALT  (SGPT) U/L 64* 16   AST (SGOT) U/L 91* 16   GLUCOSE mg/dL 108* 118*         Lab Results   Lab Value Date/Time    LIPASE 17 12/18/2022 1648    LIPASE 30 06/01/2022 1501    LIPASE 23 04/15/2021 1043       Radiology:  CT Abdomen Pelvis With Contrast   Final Result          Assessment & Plan   Patient Active Problem List   Diagnosis   • Bipolar 1 disorder (HCC)   • Anxiety   • Adrenal mass (HCC)   • Depression   • Heart murmur   • High blood pressure   • Lumbar spinal stenosis   • Pulmonary emphysema (HCC)   • Other acute sinusitis   • Renal cancer (HCC)   • Sciatica   • Hypothyroidism   • Dizziness   • Adrenal adenoma   • Incisional hernia, without obstruction or gangrene   • Pancolitis (HCC)       Assessment:  1. Colitis  2. Salmonella    Plan:  · On ceftriaxone; ok to continue as inpatient.  Would transition to a fluroquinolone at discharge  · Discussed f/u colonoscopy as outpatient to document healing of colitis.      I discussed the patients findings and my recommendations with patient and primary care team.    Mandy Lacy MD

## 2022-12-19 NOTE — PLAN OF CARE
Goal Outcome Evaluation:              Outcome Evaluation: Pt vss. Pt medicated for pain with prn dilaudid. No other complaints during shift. Will continue to monitor.

## 2022-12-20 ENCOUNTER — TELEPHONE (OUTPATIENT)
Dept: GASTROENTEROLOGY | Facility: CLINIC | Age: 42
End: 2022-12-20

## 2022-12-20 LAB
ALBUMIN SERPL-MCNC: 3.4 G/DL (ref 3.5–5.2)
ALBUMIN/GLOB SERPL: 1.1 G/DL
ALP SERPL-CCNC: 130 U/L (ref 39–117)
ALT SERPL W P-5'-P-CCNC: 42 U/L (ref 1–41)
ANION GAP SERPL CALCULATED.3IONS-SCNC: 9.4 MMOL/L (ref 5–15)
AST SERPL-CCNC: 26 U/L (ref 1–40)
BASOPHILS # BLD AUTO: 0.06 10*3/MM3 (ref 0–0.2)
BASOPHILS NFR BLD AUTO: 0.6 % (ref 0–1.5)
BILIRUB SERPL-MCNC: 0.2 MG/DL (ref 0–1.2)
BUN SERPL-MCNC: 9 MG/DL (ref 6–20)
BUN/CREAT SERPL: 7.8 (ref 7–25)
CALCIUM SPEC-SCNC: 8.6 MG/DL (ref 8.6–10.5)
CHLORIDE SERPL-SCNC: 101 MMOL/L (ref 98–107)
CO2 SERPL-SCNC: 24.6 MMOL/L (ref 22–29)
CREAT SERPL-MCNC: 1.16 MG/DL (ref 0.76–1.27)
DEPRECATED RDW RBC AUTO: 38.7 FL (ref 37–54)
EGFRCR SERPLBLD CKD-EPI 2021: 80.6 ML/MIN/1.73
EOSINOPHIL # BLD AUTO: 0.33 10*3/MM3 (ref 0–0.4)
EOSINOPHIL NFR BLD AUTO: 3.1 % (ref 0.3–6.2)
ERYTHROCYTE [DISTWIDTH] IN BLOOD BY AUTOMATED COUNT: 11.8 % (ref 12.3–15.4)
GLOBULIN UR ELPH-MCNC: 3.2 GM/DL
GLUCOSE BLDC GLUCOMTR-MCNC: 104 MG/DL (ref 70–99)
GLUCOSE BLDC GLUCOMTR-MCNC: 121 MG/DL (ref 70–99)
GLUCOSE BLDC GLUCOMTR-MCNC: 89 MG/DL (ref 70–99)
GLUCOSE BLDC GLUCOMTR-MCNC: 96 MG/DL (ref 70–99)
GLUCOSE BLDC GLUCOMTR-MCNC: 98 MG/DL (ref 70–99)
GLUCOSE SERPL-MCNC: 95 MG/DL (ref 65–99)
HCT VFR BLD AUTO: 40.4 % (ref 37.5–51)
HGB BLD-MCNC: 13.8 G/DL (ref 13–17.7)
IMM GRANULOCYTES # BLD AUTO: 0.06 10*3/MM3 (ref 0–0.05)
IMM GRANULOCYTES NFR BLD AUTO: 0.6 % (ref 0–0.5)
LYMPHOCYTES # BLD AUTO: 2.14 10*3/MM3 (ref 0.7–3.1)
LYMPHOCYTES NFR BLD AUTO: 20.1 % (ref 19.6–45.3)
MAGNESIUM SERPL-MCNC: 2.3 MG/DL (ref 1.6–2.6)
MCH RBC QN AUTO: 30.7 PG (ref 26.6–33)
MCHC RBC AUTO-ENTMCNC: 34.2 G/DL (ref 31.5–35.7)
MCV RBC AUTO: 89.8 FL (ref 79–97)
MONOCYTES # BLD AUTO: 1.4 10*3/MM3 (ref 0.1–0.9)
MONOCYTES NFR BLD AUTO: 13.2 % (ref 5–12)
NEUTROPHILS NFR BLD AUTO: 6.64 10*3/MM3 (ref 1.7–7)
NEUTROPHILS NFR BLD AUTO: 62.4 % (ref 42.7–76)
NRBC BLD AUTO-RTO: 0 /100 WBC (ref 0–0.2)
PHOSPHATE SERPL-MCNC: 3.1 MG/DL (ref 2.5–4.5)
PLATELET # BLD AUTO: 269 10*3/MM3 (ref 140–450)
PMV BLD AUTO: 8.4 FL (ref 6–12)
POTASSIUM SERPL-SCNC: 3.6 MMOL/L (ref 3.5–5.2)
PROT SERPL-MCNC: 6.6 G/DL (ref 6–8.5)
RBC # BLD AUTO: 4.5 10*6/MM3 (ref 4.14–5.8)
SODIUM SERPL-SCNC: 135 MMOL/L (ref 136–145)
WBC NRBC COR # BLD: 10.63 10*3/MM3 (ref 3.4–10.8)

## 2022-12-20 PROCEDURE — 99232 SBSQ HOSP IP/OBS MODERATE 35: CPT | Performed by: INTERNAL MEDICINE

## 2022-12-20 PROCEDURE — 0 CEFTRIAXONE PER 250 MG: Performed by: STUDENT IN AN ORGANIZED HEALTH CARE EDUCATION/TRAINING PROGRAM

## 2022-12-20 PROCEDURE — 84100 ASSAY OF PHOSPHORUS: CPT | Performed by: INTERNAL MEDICINE

## 2022-12-20 PROCEDURE — 83735 ASSAY OF MAGNESIUM: CPT | Performed by: INTERNAL MEDICINE

## 2022-12-20 PROCEDURE — 85025 COMPLETE CBC W/AUTO DIFF WBC: CPT | Performed by: INTERNAL MEDICINE

## 2022-12-20 PROCEDURE — 99233 SBSQ HOSP IP/OBS HIGH 50: CPT | Performed by: STUDENT IN AN ORGANIZED HEALTH CARE EDUCATION/TRAINING PROGRAM

## 2022-12-20 PROCEDURE — 25010000002 HYDROMORPHONE 1 MG/ML SOLUTION: Performed by: STUDENT IN AN ORGANIZED HEALTH CARE EDUCATION/TRAINING PROGRAM

## 2022-12-20 PROCEDURE — 94799 UNLISTED PULMONARY SVC/PX: CPT

## 2022-12-20 PROCEDURE — 25010000002 HYDROMORPHONE 1 MG/ML SOLUTION: Performed by: INTERNAL MEDICINE

## 2022-12-20 PROCEDURE — 82962 GLUCOSE BLOOD TEST: CPT

## 2022-12-20 PROCEDURE — 80053 COMPREHEN METABOLIC PANEL: CPT | Performed by: INTERNAL MEDICINE

## 2022-12-20 RX ORDER — HYDROCODONE BITARTRATE AND ACETAMINOPHEN 5; 325 MG/1; MG/1
1 TABLET ORAL EVERY 4 HOURS PRN
Status: DISCONTINUED | OUTPATIENT
Start: 2022-12-20 | End: 2022-12-21 | Stop reason: HOSPADM

## 2022-12-20 RX ADMIN — HYDROMORPHONE HYDROCHLORIDE 0.25 MG: 1 INJECTION, SOLUTION INTRAMUSCULAR; INTRAVENOUS; SUBCUTANEOUS at 07:49

## 2022-12-20 RX ADMIN — HYDROCODONE BITARTRATE AND ACETAMINOPHEN 1 TABLET: 5; 325 TABLET ORAL at 12:02

## 2022-12-20 RX ADMIN — HYDROCODONE BITARTRATE AND ACETAMINOPHEN 1 TABLET: 5; 325 TABLET ORAL at 16:06

## 2022-12-20 RX ADMIN — HYDROMORPHONE HYDROCHLORIDE 0.25 MG: 1 INJECTION, SOLUTION INTRAMUSCULAR; INTRAVENOUS; SUBCUTANEOUS at 04:41

## 2022-12-20 RX ADMIN — AMLODIPINE BESYLATE 5 MG: 5 TABLET ORAL at 08:10

## 2022-12-20 RX ADMIN — METRONIDAZOLE 500 MG: 500 INJECTION, SOLUTION INTRAVENOUS at 12:02

## 2022-12-20 RX ADMIN — LEVOTHYROXINE SODIUM 50 MCG: 50 TABLET ORAL at 06:10

## 2022-12-20 RX ADMIN — CEFTRIAXONE SODIUM 2 G: 2 INJECTION, SOLUTION INTRAVENOUS at 03:28

## 2022-12-20 RX ADMIN — HYDROCODONE BITARTRATE AND ACETAMINOPHEN 1 TABLET: 5; 325 TABLET ORAL at 20:05

## 2022-12-20 RX ADMIN — SODIUM CHLORIDE, POTASSIUM CHLORIDE, SODIUM LACTATE AND CALCIUM CHLORIDE 125 ML/HR: 600; 310; 30; 20 INJECTION, SOLUTION INTRAVENOUS at 06:10

## 2022-12-20 RX ADMIN — HYDROMORPHONE HYDROCHLORIDE 0.25 MG: 1 INJECTION, SOLUTION INTRAMUSCULAR; INTRAVENOUS; SUBCUTANEOUS at 00:21

## 2022-12-20 RX ADMIN — METRONIDAZOLE 500 MG: 500 INJECTION, SOLUTION INTRAVENOUS at 04:31

## 2022-12-20 RX ADMIN — HYDROMORPHONE HYDROCHLORIDE 0.5 MG: 1 INJECTION, SOLUTION INTRAMUSCULAR; INTRAVENOUS; SUBCUTANEOUS at 10:05

## 2022-12-20 RX ADMIN — Medication 10 ML: at 20:04

## 2022-12-20 NOTE — PLAN OF CARE
Goal Outcome Evaluation:  Plan of Care Reviewed With: patient        Progress: no change  Outcome Evaluation: Patient has complained of pain early in shift and recieved PRN Dilaudid x2, patient was started on PRN Norco and has recieved x2 and states his pain is much better on PRN Norco. Patient has had LR decreased from 125 to 75 ml/hr in IV fluids and has tolerated advancing onto a full liquid diet at this time during shift. Patient has shown no signs or symptoms of distress at this time and none are reported to RN during shift. Patient has call light within reach and is educated and aware of how to use at this time.

## 2022-12-20 NOTE — CONSULTS
Discharge Planning Assessment   Desiree     Patient Name: Donte Stallings  MRN: [id number]  Today's Date: 12/20/2022    Admit Date: 12/18/2022    Plan: Discussed DC plan with pt's wife at bedside. Pt resting. Pt admitted with pancolitis; GI consulted. Pt independent at home. Wife denies issues with transportation or affording medicaitons. Wife would like a list of PCPs at DC. At this time, wife denies any additional needs.  Expected Discharge Date and Time     Expected Discharge Date Expected Discharge Time    Dec 21, 2022       OLMAN Abdalla

## 2022-12-20 NOTE — PROGRESS NOTES
[facility]   Hospitalist Progress Note  Date: 2022  Patient Name: Donte Stallings  : 1980  MRN: [id number]  Date of admission: 2022  Consultants:   -Gastroenterology: Dr. Mandy Lacy    Subjective   Subjective     Chief Complaint: Abdominal pain, diarrhea    Summary:   Donte Stallings is a 42 y.o. male with anxiety/depression, COPD/asthma, chronic ongoing tobacco abuse with cigarettes and hypothyroidism who presented to the ED with complaints of abdominal pain and diarrhea that have been ongoing for roughly 5 to 6 days prior to presentation.  Of note, patient prescribed amoxicillin as an outpatient for prophylaxis for dental procedure which she had been taking prior to onset of symptoms.  Eval in ED showed patient have elevated WBC.  CT abdomen/pelvis obtained and showed pancolitis.  Antibiotics initiated in the ED.  Hospitalist service contacted for further evaluation and management.    Interval Followup: Patient still having some abdominal pain this morning.  He is requesting intravenous pain medication.  He has not had any fevers or chills.  He is stating he would like to try food today.  Discussed with nursing and we will attempt later on today.  Patient denies any current chest pain or chest pressure.  He remains hemodynamically stable.      Antibiotics:   -Ceftriaxone  -Flagyl    Pain Medication:   -IV Dilaudid    Review of Systems   All systems reviewed and negative unless stated otherwise under subjective.    Objective   Objective     Vitals:   Temp:  [97.5 °F (36.4 °C)-98.6 °F (37 °C)] 97.5 °F (36.4 °C)  Heart Rate:  [64-75] 65  Resp:  [18-20] 18  BP: (123-141)/(77-92) 129/77  Physical Exam   Constitutional: Alert, awake, sitting up in bed  HEENT:  PERRLA, EOMI; No Scleral icterus  Neck:  Supple; No Mass, No Lymphadenopathy  Cardiovascular:  No Rubs, No Edema, No JVD  Respiratory: No respiratory distress, unlabored breathing, saturating well on room air  Abdomen: Active bowel sounds,  tender to palpation, no guarding  Musculoskeletal:  Pulses Positive all 4 Ext; No Cyanosis, No Edema  Neurological: Follows commands, no facial asymmetry, no slurred speech, moves all extremities  Skin:  No Rash, No Cellulitis, No Erythema      Result Review    Result Review:  I have personally reviewed the results as below and agree with these findings:  []  Laboratory:   CMP    CMP 12/18/22 12/19/22 12/20/22   Glucose 118 (A) 108 (A) 95   BUN 7 8 9   Creatinine 1.25 1.12 1.16   Sodium 133 (A) 135 (A) 135 (A)   Potassium 4.2 3.8 3.6   Chloride 98 102 101   Calcium 9.2 8.8 8.6   Albumin 4.20 3.80 3.40 (A)   Total Bilirubin 0.4 0.3 0.2   Alkaline Phosphatase 110 177 (A) 130 (A)   AST (SGOT) 16 91 (A) 26   ALT (SGPT) 16 64 (A) 42 (A)   (A) Abnormal value            CBC    CBC 12/18/22 12/19/22 12/20/22   WBC 19.63 (A) 16.12 (A) 10.63   RBC 4.82 4.50 4.50   Hemoglobin 14.8 14.1 13.8   Hematocrit 43.2 40.1 40.4   MCV 89.6 89.1 89.8   MCH 30.7 31.3 30.7   MCHC 34.3 35.2 34.2   RDW 11.9 (A) 11.9 (A) 11.8 (A)   Platelets 278 269 269   (A) Abnormal value            [x]  Microbiology: Salmonella 12/19/2022  []  Radiology:   [x]  EKG/Telemetry:    []  Cardiology/Vascular:    []  Pathology:  []  Old records:  []  Other:    Assessment & Plan   Assessment / Plan     Assessment:  Salmonella  Pancolitis  Severe diarrhea  COPD/asthma, not acutely exacerbated  Chronic ongoing tobacco abuse with cigarettes  Hypothyroidism  Essential hypertension    Plan:  Continue ceftriaxone for Salmonella, will discontinue Flagyl, will treat for a total of 7 days and switch to fluoroquinolone at discharge  Continue intravenous fluids, will decrease to 75 mils an hour  Enteric panel reviewed, Salmonella detected  Will add hydrocodone and continue with Dilaudid as needed  Placed order for full liquids, will advance diet as tolerated  Review of Synthroid given hypothyroidism  Continue with nicotine patch, patient counseled importance of smoking  cessation  Renal function remained stable, continue to monitor  White blood cell count improved, recheck CBC in a.m.  Labs reviewed, imaging reviewed, medications reviewed, vital signs reviewed  Further recommendations pending clinical course    Code Status: Full code     Discussed with RN.    DVT prophylaxis:  Mechanical DVT prophylaxis orders are present.    CODE STATUS:   Level Of Support Discussed With: Patient  Code Status (Patient has no pulse and is not breathing): CPR (Attempt to Resuscitate)  Medical Interventions (Patient has pulse or is breathing): Full Support      Electronically signed by Callum Birmingham DO, 12/20/22, 8:33 AM EST.

## 2022-12-20 NOTE — PLAN OF CARE
Goal Outcome Evaluation:  Plan of Care Reviewed With: patient, spouse        Progress: no change  Outcome Evaluation: Pt c/o pain frequently this shift, administered prn pain meds as ordered. On continuous IV fluids as ordered. Wife at bedside. Pt was able to rest well this shift. No new issues or needs at this time.

## 2022-12-21 ENCOUNTER — READMISSION MANAGEMENT (OUTPATIENT)
Dept: CALL CENTER | Facility: HOSPITAL | Age: 42
End: 2022-12-21

## 2022-12-21 ENCOUNTER — PREP FOR SURGERY (OUTPATIENT)
Dept: OTHER | Facility: HOSPITAL | Age: 42
End: 2022-12-21

## 2022-12-21 VITALS
BODY MASS INDEX: 24.84 KG/M2 | WEIGHT: 193.56 LBS | DIASTOLIC BLOOD PRESSURE: 82 MMHG | OXYGEN SATURATION: 98 % | TEMPERATURE: 97.9 F | RESPIRATION RATE: 16 BRPM | HEIGHT: 74 IN | SYSTOLIC BLOOD PRESSURE: 129 MMHG | HEART RATE: 62 BPM

## 2022-12-21 DIAGNOSIS — K52.9 COLITIS: Primary | ICD-10-CM

## 2022-12-21 LAB
ALBUMIN SERPL-MCNC: 3.8 G/DL (ref 3.5–5.2)
ALBUMIN/GLOB SERPL: 1.4 G/DL
ALP SERPL-CCNC: 107 U/L (ref 39–117)
ALT SERPL W P-5'-P-CCNC: 34 U/L (ref 1–41)
ANION GAP SERPL CALCULATED.3IONS-SCNC: 8.6 MMOL/L (ref 5–15)
AST SERPL-CCNC: 20 U/L (ref 1–40)
BASOPHILS # BLD AUTO: 0.06 10*3/MM3 (ref 0–0.2)
BASOPHILS NFR BLD AUTO: 0.7 % (ref 0–1.5)
BILIRUB SERPL-MCNC: 0.2 MG/DL (ref 0–1.2)
BUN SERPL-MCNC: 11 MG/DL (ref 6–20)
BUN/CREAT SERPL: 9.9 (ref 7–25)
CALCIUM SPEC-SCNC: 8.8 MG/DL (ref 8.6–10.5)
CHLORIDE SERPL-SCNC: 104 MMOL/L (ref 98–107)
CO2 SERPL-SCNC: 25.4 MMOL/L (ref 22–29)
CREAT SERPL-MCNC: 1.11 MG/DL (ref 0.76–1.27)
DEPRECATED RDW RBC AUTO: 37.8 FL (ref 37–54)
EGFRCR SERPLBLD CKD-EPI 2021: 85 ML/MIN/1.73
EOSINOPHIL # BLD AUTO: 0.4 10*3/MM3 (ref 0–0.4)
EOSINOPHIL NFR BLD AUTO: 4.9 % (ref 0.3–6.2)
ERYTHROCYTE [DISTWIDTH] IN BLOOD BY AUTOMATED COUNT: 11.8 % (ref 12.3–15.4)
GLOBULIN UR ELPH-MCNC: 2.7 GM/DL
GLUCOSE BLDC GLUCOMTR-MCNC: 85 MG/DL (ref 70–99)
GLUCOSE BLDC GLUCOMTR-MCNC: 88 MG/DL (ref 70–99)
GLUCOSE BLDC GLUCOMTR-MCNC: 96 MG/DL (ref 70–99)
GLUCOSE SERPL-MCNC: 95 MG/DL (ref 65–99)
HCT VFR BLD AUTO: 39.7 % (ref 37.5–51)
HGB BLD-MCNC: 13.8 G/DL (ref 13–17.7)
IMM GRANULOCYTES # BLD AUTO: 0.06 10*3/MM3 (ref 0–0.05)
IMM GRANULOCYTES NFR BLD AUTO: 0.7 % (ref 0–0.5)
LYMPHOCYTES # BLD AUTO: 2.35 10*3/MM3 (ref 0.7–3.1)
LYMPHOCYTES NFR BLD AUTO: 28.6 % (ref 19.6–45.3)
MAGNESIUM SERPL-MCNC: 2.1 MG/DL (ref 1.6–2.6)
MCH RBC QN AUTO: 30.9 PG (ref 26.6–33)
MCHC RBC AUTO-ENTMCNC: 34.8 G/DL (ref 31.5–35.7)
MCV RBC AUTO: 88.8 FL (ref 79–97)
MONOCYTES # BLD AUTO: 1.06 10*3/MM3 (ref 0.1–0.9)
MONOCYTES NFR BLD AUTO: 12.9 % (ref 5–12)
NEUTROPHILS NFR BLD AUTO: 4.28 10*3/MM3 (ref 1.7–7)
NEUTROPHILS NFR BLD AUTO: 52.2 % (ref 42.7–76)
NRBC BLD AUTO-RTO: 0 /100 WBC (ref 0–0.2)
PHOSPHATE SERPL-MCNC: 3.5 MG/DL (ref 2.5–4.5)
PLATELET # BLD AUTO: 307 10*3/MM3 (ref 140–450)
PMV BLD AUTO: 8.7 FL (ref 6–12)
POTASSIUM SERPL-SCNC: 3.8 MMOL/L (ref 3.5–5.2)
PROT SERPL-MCNC: 6.5 G/DL (ref 6–8.5)
RBC # BLD AUTO: 4.47 10*6/MM3 (ref 4.14–5.8)
SODIUM SERPL-SCNC: 138 MMOL/L (ref 136–145)
WBC NRBC COR # BLD: 8.21 10*3/MM3 (ref 3.4–10.8)

## 2022-12-21 PROCEDURE — 0 CEFTRIAXONE PER 250 MG: Performed by: STUDENT IN AN ORGANIZED HEALTH CARE EDUCATION/TRAINING PROGRAM

## 2022-12-21 PROCEDURE — 85025 COMPLETE CBC W/AUTO DIFF WBC: CPT | Performed by: STUDENT IN AN ORGANIZED HEALTH CARE EDUCATION/TRAINING PROGRAM

## 2022-12-21 PROCEDURE — 83735 ASSAY OF MAGNESIUM: CPT | Performed by: STUDENT IN AN ORGANIZED HEALTH CARE EDUCATION/TRAINING PROGRAM

## 2022-12-21 PROCEDURE — 82962 GLUCOSE BLOOD TEST: CPT

## 2022-12-21 PROCEDURE — 80053 COMPREHEN METABOLIC PANEL: CPT | Performed by: STUDENT IN AN ORGANIZED HEALTH CARE EDUCATION/TRAINING PROGRAM

## 2022-12-21 PROCEDURE — 99239 HOSP IP/OBS DSCHRG MGMT >30: CPT | Performed by: STUDENT IN AN ORGANIZED HEALTH CARE EDUCATION/TRAINING PROGRAM

## 2022-12-21 PROCEDURE — 84100 ASSAY OF PHOSPHORUS: CPT | Performed by: STUDENT IN AN ORGANIZED HEALTH CARE EDUCATION/TRAINING PROGRAM

## 2022-12-21 RX ORDER — HYDROCODONE BITARTRATE AND ACETAMINOPHEN 5; 325 MG/1; MG/1
1 TABLET ORAL EVERY 4 HOURS PRN
Qty: 9 TABLET | Refills: 0 | Status: SHIPPED | OUTPATIENT
Start: 2022-12-21 | End: 2022-12-24

## 2022-12-21 RX ORDER — LEVOFLOXACIN 750 MG/1
750 TABLET ORAL DAILY
Qty: 4 TABLET | Refills: 0 | Status: SHIPPED | OUTPATIENT
Start: 2022-12-21 | End: 2022-12-25

## 2022-12-21 RX ORDER — DOCUSATE SODIUM 100 MG/1
100 CAPSULE, LIQUID FILLED ORAL 2 TIMES DAILY
Qty: 28 CAPSULE | Refills: 0 | Status: SHIPPED | OUTPATIENT
Start: 2022-12-21 | End: 2023-01-04

## 2022-12-21 RX ADMIN — LEVOTHYROXINE SODIUM 50 MCG: 50 TABLET ORAL at 05:07

## 2022-12-21 RX ADMIN — AMLODIPINE BESYLATE 5 MG: 5 TABLET ORAL at 09:11

## 2022-12-21 RX ADMIN — HYDROCODONE BITARTRATE AND ACETAMINOPHEN 1 TABLET: 5; 325 TABLET ORAL at 00:48

## 2022-12-21 RX ADMIN — HYDROCODONE BITARTRATE AND ACETAMINOPHEN 1 TABLET: 5; 325 TABLET ORAL at 05:07

## 2022-12-21 RX ADMIN — Medication 10 ML: at 09:12

## 2022-12-21 RX ADMIN — CEFTRIAXONE SODIUM 2 G: 2 INJECTION, SOLUTION INTRAVENOUS at 04:59

## 2022-12-21 NOTE — PROGRESS NOTES
[facility]  Inpatient Progress Note    Reason for Follow Up:  colitis    Subjective     Interval History:   Pt reports still with diarrhea but overall improved.  No melena, hematochezia.  Still with abd cramping.    Current Facility-Administered Medications:   •  amLODIPine (NORVASC) tablet 5 mg, 5 mg, Oral, Daily, Pete Ahmadi MD, 5 mg at 12/20/22 0810  •  cefTRIAXone (ROCEPHIN) IVPB 2 g, 2 g, Intravenous, Q24H, Pete Ahmadi MD, Last Rate: 100 mL/hr at 12/20/22 0328, 2 g at 12/20/22 0328  •  HYDROcodone-acetaminophen (NORCO) 5-325 MG per tablet 1 tablet, 1 tablet, Oral, Q4H PRN, Callum Birmingham DO, 1 tablet at 12/20/22 1606  •  HYDROmorphone (DILAUDID) injection 0.5 mg, 0.5 mg, Intravenous, Q3H PRN, Patrick Haider MD  •  ipratropium-albuterol (DUO-NEB) nebulizer solution 3 mL, 3 mL, Nebulization, Q6H PRN, Pete Ahmadi MD  •  lactated ringers infusion, 75 mL/hr, Intravenous, Continuous, Callum Birmingham DO, Last Rate: 75 mL/hr at 12/20/22 1453, 75 mL/hr at 12/20/22 1453  •  levothyroxine (SYNTHROID, LEVOTHROID) tablet 50 mcg, 50 mcg, Oral, Q AM, Pete Ahmadi MD, 50 mcg at 12/20/22 0610  •  melatonin tablet 10 mg, 10 mg, Oral, Nightly PRN, Gelacio Christine, PA, 10 mg at 12/19/22 2026  •  naloxone (NARCAN) injection 0.4 mg, 0.4 mg, Intravenous, Q5 Min PRN, Patrick Haider MD  •  nicotine (NICODERM CQ) 7 MG/24HR patch 1 patch, 1 patch, Transdermal, Q24H, Pete Ahmadi MD  •  nitroglycerin (NITROSTAT) SL tablet 0.4 mg, 0.4 mg, Sublingual, Q5 Min PRN, Erika Ahmadir R, MD  •  sodium chloride 0.9 % flush 10 mL, 10 mL, Intravenous, Galdino ALCAZAR Ranadheer R, MD  •  sodium chloride 0.9 % flush 10 mL, 10 mL, Intravenous, Galdino ALCAZAR Ranadheer R, MD  •  sodium chloride 0.9 % flush 10 mL, 10 mL, Intravenous, Q12H, Pete Ahmadi MD, 10 mL at 12/19/22 2026  •  sodium chloride 0.9 % infusion 40 mL, 40 mL, Intravenous, Galdino ALCAZAR Ranadheer R, MD, 40 mL at 12/19/22  0321  Review of Systems:    The following systems were reviewed and negative;  respiratory and cardiovascular    Objective     Vital Signs  Temp:  [97.5 °F (36.4 °C)-98.6 °F (37 °C)] 98.2 °F (36.8 °C)  Heart Rate:  [54-69] 54  Resp:  [18-20] 20  BP: (117-141)/(74-93) 117/74  Body mass index is 24.85 kg/m².    Intake/Output Summary (Last 24 hours) at 12/20/2022 1918  Last data filed at 12/20/2022 1817  Gross per 24 hour   Intake 660 ml   Output --   Net 660 ml     No intake/output data recorded.     Physical Exam:   General: awake, alert and in no acute distress   Eyes: eyes move symmetrical in all directions, no scleral icterus   Neck: supple, trachea is midline   Skin: warm and dry, not jaundiced   Cardiovascular: no chest tenderness   Pulm: breathing unlabored   Abdomen: soft, nondistended, lower abd TTP   Rectal: deferred   Extremities: no rash or edema   Psychiatric: mental status within normal limits      Results Review:     I reviewed the patient's new clinical results.    Results from last 7 days   Lab Units 12/20/22  0513 12/19/22  0507 12/18/22  1648   WBC 10*3/mm3 10.63 16.12* 19.63*   HEMOGLOBIN g/dL 13.8 14.1 14.8   HEMATOCRIT % 40.4 40.1 43.2   PLATELETS 10*3/mm3 269 269 278     Results from last 7 days   Lab Units 12/20/22  0513 12/19/22  0507 12/18/22  1648   SODIUM mmol/L 135* 135* 133*   POTASSIUM mmol/L 3.6 3.8 4.2   CHLORIDE mmol/L 101 102 98   CO2 mmol/L 24.6 20.8* 24.9   BUN mg/dL 9 8 7   CREATININE mg/dL 1.16 1.12 1.25   CALCIUM mg/dL 8.6 8.8 9.2   BILIRUBIN mg/dL 0.2 0.3 0.4   ALK PHOS U/L 130* 177* 110   ALT (SGPT) U/L 42* 64* 16   AST (SGOT) U/L 26 91* 16   GLUCOSE mg/dL 95 108* 118*         Lab Results   Lab Value Date/Time    LIPASE 17 12/18/2022 1648    LIPASE 30 06/01/2022 1501    LIPASE 23 04/15/2021 1043       Radiology:  [unfilled]      Assessment & Plan   Assessment:     Colitis  Salmonella    Plan:     • On ceftriaxone; ok to continue as inpatient.  Would transition to a  fluroquinolone at discharge  • Discussed f/u colonoscopy as outpatient to document healing of colitis.  • Will have my office arrange f/u colonoscopy in 6 - 8 weeks.  My office to arrange.  • Please call if can be of further assistance     I discussed the patients findings and my recommendations with patient and family.         Mandy Lacy M.D.  [place] [address] [place]  [address]  Office: [phone]

## 2022-12-21 NOTE — PLAN OF CARE
Goal Outcome Evaluation:  Plan of Care Reviewed With: patient        Progress: no change  Outcome Evaluation: patient is alert and oriented this shift. no c/o pain. tolerated bland regular diet. will discharge this shift.

## 2022-12-21 NOTE — TELEPHONE ENCOUNTER
Pt is agreeable to colonoscopy on 02/14/23. Pt given prep instructions as well as sent via [facility].

## 2022-12-21 NOTE — PLAN OF CARE
Goal Outcome Evaluation:           Progress: no change  Outcome Evaluation: Norco given for pain, pain managed well per patient. Continuous fluids running at 75mL/hr as ordered. Vital signs stable, will continue to monitor.

## 2022-12-21 NOTE — DISCHARGE SUMMARY
[facility]         HOSPITALIST  DISCHARGE SUMMARY    Patient Name: Donte Stallings  : 1980  MRN: [id number]    Date of Admission: 2022  Date of Discharge: 2022  Primary Care Physician: Dawit Cazares APRN    Consults     Date and Time Order Name Status Description    2022 11:17 AM Inpatient Gastroenterology Consult Completed           Active and Resolved Hospital Problems:  Active Hospital Problems    Diagnosis POA   • **Pancolitis (HCC) [K51.00] Yes      Resolved Hospital Problems   No resolved problems to display.       Hospital Course     Hospital Course:  Donte Stallings is a 42 y.o. male with anxiety/depression, COPD/asthma, chronic ongoing tobacco abuse with cigarettes and hypothyroidism who presented to the ER with complaints of abdominal pain and diarrhea that have been ongoing for roughly 5 to 6 days prior to presentation.  Of note, patient prescribed amoxicillin as an outpatient for prophylaxis for dental procedure which she had been taking prior to onset of symptoms.    Initial evaluation in the ER showed CT abdomen pelvis with pancolitis.  Patient had cultures drawn that showed Salmonella.  Patient empirically started on Rocephin and Flagyl.  Flagyl ultimately discontinued.  Gastroenterology also consulted and recommended continuing therapy with a fluoroquinolone.  Patient will continue with Levaquin for total 7 days therapy.  He will follow-up with gastroenterology further recommendations.  He will continue other home medications as before.  He will follow-up with his PCP in 1 week.  Patient discharged in stable condition.    Discharge problem list:  Salmonella  Pancolitis  Severe diarrhea  COPD/asthma, not acutely exacerbated  Chronic ongoing tobacco abuse with cigarettes  Hypothyroidism  Essential hypertension        DISCHARGE Follow Up Recommendations for labs and diagnostics: PCP 1 week.  Gastroenterology as recommended.      Day of Discharge     Vital  Signs:  Temp:  [97.7 °F (36.5 °C)-98.1 °F (36.7 °C)] 97.9 °F (36.6 °C)  Heart Rate:  [59-63] 62  Resp:  [16-20] 16  BP: (118-129)/(74-82) 129/82  Physical Exam:   Constitutional: Alert, awake, no acute distress  HEENT:  PERRLA, EOMI; No Scleral icterus  Neck:  Supple; No Mass, No Lymphadenopathy  Cardiovascular:  No Rubs, No Edema, No JVD  Respiratory: No respiratory distress, unlabored breathing, saturating well on room air  Abdomen:  Normal BS all 4 Quadrants; No Guarding, No Tenderness  Musculoskeletal:  Pulses Positive all 4 Ext; No Cyanosis, No Edema  Neurological:  Alert+Ox3, Mental status WNL; No Dysarthria  Skin:  No Rash, No Cellulitis, No Erythema      Discharge Details        Discharge Medications      New Medications      Instructions Start Date   docusate sodium 100 MG capsule  Commonly known as: Colace   100 mg, Oral, 2 Times Daily      HYDROcodone-acetaminophen 5-325 MG per tablet  Commonly known as: NORCO   1 tablet, Oral, Every 4 Hours PRN      levoFLOXacin 750 MG tablet  Commonly known as: Levaquin   750 mg, Oral, Daily      nicotine 7 MG/24HR patch  Commonly known as: NICODERM CQ   1 patch, Transdermal, Every 24 Hours Scheduled      polyethylene glycol 236 g solution  Commonly known as: GoLYTELY   Take as directed         Continue These Medications      Instructions Start Date   amLODIPine 5 MG tablet  Commonly known as: NORVASC   5 mg, Oral, Daily      levothyroxine 50 MCG tablet  Commonly known as: SYNTHROID, LEVOTHROID   50 mcg, Oral, Daily             No Known Allergies    Discharge Disposition:  Home or Self Care    Diet:  Hospital:No active diet order      Discharge Activity:       CODE STATUS:  Code Status and Medical Interventions:   Ordered at: 12/18/22 2125     Level Of Support Discussed With:    Patient     Code Status (Patient has no pulse and is not breathing):    CPR (Attempt to Resuscitate)     Medical Interventions (Patient has pulse or is breathing):    Full Support         Future  Appointments   Date Time Provider Department Center   12/27/2022  3:15 PM HeberNatalia NANCIE [place] [facility]   6/26/2023  1:15 PM Marli Hamilton [place] [facility]           Pertinent  and/or Most Recent Results     PROCEDURES:   CT Abdomen Pelvis With Contrast    Result Date: 12/18/2022  Narrative: PROCEDURE: CT ABDOMEN PELVIS W CONTRAST  COMPARISON: [facility], CT, ABDOMEN/PELVIS WITH CONTRAST, 4/15/2021, 11:45.  [facility], CT, CT ABDOMEN PELVIS W CONTRAST, 6/01/2022, 17:26.  INDICATIONS: LLQ abd pain  PROTOCOL:   Standard imaging protocol performed    RADIATION:   DLP: 474 mGy*cm   Automated exposure control was utilized to minimize radiation dose. CONTRAST: 100 cc Isovue 370 I.V. LABS:   eGFR: >60 ml/min/1.73m2  TECHNIQUE: Axial images of the abdomen and pelvis with intravenous contrast.  ABDOMEN:  The lung bases are clear.  The liver, spleen, pancreas and right adrenal gland are normal.  Stable 2.3 cm left adrenal adenoma.  There are no inflammatory changes around the gallbladder.  Prior right nephrectomy.  PELVIS:  No evidence of bowel obstruction, perforation or abscess.  The appendix and terminal ileum are normal.  There are areas of mild wall thickening with adjacent inflammatory change throughout the colon consistent with pancolitis.  The abdominal aorta has a normal caliber.  The mesenteric vessels are patent.  There is minimal pelvic free fluid.  The urinary bladder is normal.  L5 pars defects are present without evidence of anterolisthesis.  Prior ventral hernia repair.  IMPRESSION:  Pancolitis.  BERNICE ERICKSON MD       Electronically Signed and Approved By: BERNICE ERICKSON MD on 12/18/2022 at 18:12                 LAB RESULTS:      Lab 12/21/22  0507 12/20/22  0513 12/19/22  0507 12/18/22 2011 12/18/22  1648   WBC 8.21 10.63 16.12*  --  19.63*   HEMOGLOBIN 13.8 13.8 14.1  --  14.8   HEMATOCRIT 39.7 40.4 40.1  --  43.2   PLATELETS 307 269 269  --  278    NEUTROS ABS 4.28 6.64 12.10*  --  16.09*   IMMATURE GRANS (ABS) 0.06* 0.06* 0.12*  --  0.07*   LYMPHS ABS 2.35 2.14 1.85  --  1.55   MONOS ABS 1.06* 1.40* 1.93*  --  1.83*   EOS ABS 0.40 0.33 0.07  --  0.03   MCV 88.8 89.8 89.1  --  89.6   LACTATE  --   --   --  0.7  --          Lab 12/21/22  0507 12/20/22  0513 12/19/22  0507 12/18/22  1648   SODIUM 138 135* 135* 133*   POTASSIUM 3.8 3.6 3.8 4.2   CHLORIDE 104 101 102 98   CO2 25.4 24.6 20.8* 24.9   ANION GAP 8.6 9.4 12.2 10.1   BUN 11 9 8 7   CREATININE 1.11 1.16 1.12 1.25   EGFR 85.0 80.6 84.1 73.7   GLUCOSE 95 95 108* 118*   CALCIUM 8.8 8.6 8.8 9.2   MAGNESIUM 2.1 2.3 2.0  --    PHOSPHORUS 3.5 3.1 3.3  --          Lab 12/21/22  0507 12/20/22  0513 12/19/22  0507 12/18/22  1648   TOTAL PROTEIN 6.5 6.6 7.1 7.6   ALBUMIN 3.80 3.40* 3.80 4.20   GLOBULIN 2.7 3.2 3.3 3.4   ALT (SGPT) 34 42* 64* 16   AST (SGOT) 20 26 91* 16   BILIRUBIN 0.2 0.2 0.3 0.4   ALK PHOS 107 130* 177* 110   LIPASE  --   --   --  17                     Brief Urine Lab Results  (Last result in the past 365 days)      Color   Clarity   Blood   Leuk Est   Nitrite   Protein   CREAT   Urine HCG        12/18/22 1936 Yellow   Clear   Trace   Negative   Negative   Trace               Microbiology Results (last 10 days)     Procedure Component Value - Date/Time    Clostridioides difficile Toxin - Stool, Per Rectum [[id number]] Collected: 12/18/22 1936    Lab Status: Final result Specimen: Stool from Per Rectum Updated: 12/18/22 2103    Narrative:      The following orders were created for panel order Clostridioides difficile Toxin - Stool, Per Rectum.  Procedure                               Abnormality         Status                     ---------                               -----------         ------                     Clostridioides difficile...[[id number]]                      Final result                 Please view results for these tests on the individual orders.    Enteric Bacterial Panel -  Stool, Per Rectum [[id number]]  (Abnormal) Collected: 12/18/22 1936    Lab Status: Final result Specimen: Stool from Per Rectum Updated: 12/19/22 1238     Salmonella Detected     Campylobacter Not Detected     Shigella/Enteroinvasive E. coli (EIEC) Not Detected     Shiga-like toxin-producing E. coli (STEC) stx1/stx2 Not Detected    Clostridioides difficile Toxin, PCR - Stool, Per Rectum [[id number]] Collected: 12/18/22 1936    Lab Status: Final result Specimen: Stool from Per Rectum Updated: 12/18/22 2103     C. Difficile Toxins by PCR Negative     027 Toxin Presumptive Negative                           Labs Pending at Discharge:        Time spent on Discharge including face to face service:  32 minutes    Electronically signed by Callum Birmingham DO, 12/21/22, 6:05 PM EST.

## 2022-12-21 NOTE — OUTREACH NOTE
Prep Survey    Flowsheet Row Responses   [facility] patient discharged from? [facility]   Is LACE score < 7 ? No   Eligibility [facility]   Date of Admission 12/18/22   Date of Discharge 12/21/22   Discharge Disposition Home or Self Care   Discharge diagnosis colitis, salmonella   Does the patient have one of the following disease processes/diagnoses(primary or secondary)? Other   Does the patient have Home health ordered? No   Is there a DME ordered? No   Prep survey completed? Yes          MAAME MEREDITH - Registered Nurse

## 2022-12-21 NOTE — PAYOR COMM NOTE
Donte Stallings (42 y.o. Male)     Date of Birth   1980    Social Security Number       Address   [address] [place] [address] [place] [address]    Home Phone   [phone]    MRN   [id number]       Shinto   None    Marital Status                               Admission Date   12/18/22    Admission Type   Emergency    Admitting Provider   Pete Ahmadi MD    Attending Provider       Department, Room/Bed   [facility] 4 [facility], 4007/1       Discharge Date   12/21/2022    Discharge Disposition   Home or Self Care    Discharge Destination                               Attending Provider: (none)   Allergies: No Known Allergies    Isolation: None   Infection: None   Code Status: Prior    Ht: 188 cm (74\")   Wt: 87.8 kg (193 lb 9 oz)    Admission Cmt: None   Principal Problem: Pancolitis (HCC) [K51.00]                 Active Insurance as of 12/18/2022     Primary Coverage     Payor Plan Insurance Group Employer/Plan Group    [employer] [place] [employer] [place]ICA[place]      Payor Plan Address Payor Plan Phone Number Payor Plan Fax Number Effective Dates    [address] [address] [phone]  8/17/2021 - None Entered    [facility] [id number]       Subscriber Name Subscriber Birth Date Member ID       DONTE STALLINGS 1980 [id number]                 Emergency Contacts      (Rel.) Home Phone Work Phone Mobile Phone    BRIADILCIA (Spouse) [phone] -- --      UPDATE/DC TODAY 12/21   AUTH/DAYS STILL PENDING     CONTACT   CICI S UTILIZATION REVIEW    [facility]  [address] [place] [address]  TAX ID [id number]  NP  [id number]       [phone]   [phone]      Discharge Summary    No notes of this type exist for this encounter.          Janelle Locke, RN   Registered Nurse  Plan of Care     Signed  Date of Service:  12/20/22 0514  Creation Time:  12/20/22 0514     Signed        Goal Outcome Evaluation:  Plan of Care Reviewed With: patient, spouse  Progress: no  change  Outcome Evaluation: Pt c/o pain frequently this shift, administered prn pain meds as ordered. On continuous IV fluids as ordered. Wife at bedside. Pt was able to rest well this shift. No new issues or needs at this time.                 Callum Birmingham DO   Physician  Hospitalist  Progress Notes     Signed  Date of Service:  22  Creation Time:  22     Signed        Expand All Collapse All  [facility]   Hospitalist Progress Note  Date: 2022  Patient Name: Donte Stallings  : 1980  MRN: [id number]  Date of admission: 2022  Consultants:   -Gastroenterology: Dr. Mandy Lacy        Subjective      Subjective      Chief Complaint: Abdominal pain, diarrhea     Summary:   Donte Stallings is a 42 y.o. male with anxiety/depression, COPD/asthma, chronic ongoing tobacco abuse with cigarettes and hypothyroidism who presented to the ED with complaints of abdominal pain and diarrhea that have been ongoing for roughly 5 to 6 days prior to presentation.  Of note, patient prescribed amoxicillin as an outpatient for prophylaxis for dental procedure which she had been taking prior to onset of symptoms.  Eval in ED showed patient have elevated WBC.  CT abdomen/pelvis obtained and showed pancolitis.  Antibiotics initiated in the ED.  Hospitalist service contacted for further evaluation and management.     Interval Followup: Patient still having some abdominal pain this morning.  He is requesting intravenous pain medication.  He has not had any fevers or chills.  He is stating he would like to try food today.  Discussed with nursing and we will attempt later on today.  Patient denies any current chest pain or chest pressure.  He remains hemodynamically stable.        Antibiotics:   -Ceftriaxone  -Flagyl     Pain Medication:   -IV Dilaudid     Review of Systems              All systems reviewed and negative unless stated otherwise under subjective.           Objective      Objective       Vitals:   Temp:  [97.5 °F (36.4 °C)-98.6 °F (37 °C)] 97.5 °F (36.4 °C)  Heart Rate:  [64-75] 65  Resp:  [18-20] 18  BP: (123-141)/(77-92) 129/77  Physical Exam             Constitutional: Alert, awake, sitting up in bed  HEENT:  PERRLA, EOMI; No Scleral icterus  Neck:  Supple; No Mass, No Lymphadenopathy  Cardiovascular:  No Rubs, No Edema, No JVD  Respiratory: No respiratory distress, unlabored breathing, saturating well on room air  Abdomen: Active bowel sounds, tender to palpation, no guarding  Musculoskeletal:  Pulses Positive all 4 Ext; No Cyanosis, No Edema  Neurological: Follows commands, no facial asymmetry, no slurred speech, moves all extremities  Skin:  No Rash, No Cellulitis, No Erythema              Result Review       Result Review:  I have personally reviewed the results as below and agree with these findings:  []?  Laboratory:   CMP Results:         CMP    CMP 12/18/22 12/19/22 12/20/22   Glucose 118 (A) 108 (A) 95   BUN 7 8 9   Creatinine 1.25 1.12 1.16   Sodium 133 (A) 135 (A) 135 (A)   Potassium 4.2 3.8 3.6   Chloride 98 102 101   Calcium 9.2 8.8 8.6   Albumin 4.20 3.80 3.40 (A)   Total Bilirubin 0.4 0.3 0.2   Alkaline Phosphatase 110 177 (A) 130 (A)   AST (SGOT) 16 91 (A) 26   ALT (SGPT) 16 64 (A) 42 (A)   (A) Abnormal value                  CBC Results         CBC    CBC 12/18/22 12/19/22 12/20/22   WBC 19.63 (A) 16.12 (A) 10.63   RBC 4.82 4.50 4.50   Hemoglobin 14.8 14.1 13.8   Hematocrit 43.2 40.1 40.4   MCV 89.6 89.1 89.8   MCH 30.7 31.3 30.7   MCHC 34.3 35.2 34.2   RDW 11.9 (A) 11.9 (A) 11.8 (A)   Platelets 278 269 269   (A) Abnormal value                  [x]?  Microbiology: Salmonella 12/19/2022  []?  Radiology:   [x]?  EKG/Telemetry:    []?  Cardiology/Vascular:    []?  Pathology:  []?  Old records:  []?  Other:           Assessment & Plan     Assessment / Plan      Assessment:  Salmonella  Pancolitis  Severe diarrhea  COPD/asthma, not acutely exacerbated  Chronic ongoing tobacco  abuse with cigarettes  Hypothyroidism  Essential hypertension     Plan:  Continue ceftriaxone for Salmonella, will discontinue Flagyl, will treat for a total of 7 days and switch to fluoroquinolone at discharge  Continue intravenous fluids, will decrease to 75 mils an hour  Enteric panel reviewed, Salmonella detected  Will add hydrocodone and continue with Dilaudid as needed  Placed order for full liquids, will advance diet as tolerated  Review of Synthroid given hypothyroidism  Continue with nicotine patch, patient counseled importance of smoking cessation  Renal function remained stable, continue to monitor  White blood cell count improved, recheck CBC in a.m.  Labs reviewed, imaging reviewed, medications reviewed, vital signs reviewed  Further recommendations pending clinical course    Code Status: Full code     Discussed with RN.     DVT prophylaxis:  Mechanical DVT prophylaxis orders are present.     CODE STATUS:   Level Of Support Discussed With: Patient  Code Status (Patient has no pulse and is not breathing): CPR (Attempt to Resuscitate)  Medical Interventions (Patient has pulse or is breathing): Full Support        Electronically signed by Callum Birmingham DO, 12/20/22, 8:33 AM EST.                             Ghazala Vega MSW     Case Management  Consults     Signed  Date of Service:  12/20/22 1327  Creation Time:  12/20/22 1327  Consult Orders   Inpatient Case Management  Consult [[id number]] ordered by Pete Ahmadi MD at 12/18/22 1059          Signed        Discharge Planning Assessment   Desiree     Patient Name: Donte Stallings                      MRN: [id number]  Today's Date: 12/20/2022                   Admit Date: 12/18/2022     Plan: Discussed DC plan with pt's wife at bedside. Pt resting. Pt admitted with pancolitis; GI consulted. Pt independent at home. Wife denies issues with transportation or affording medicaitons. Wife would like a list of PCPs at DC. At this  time, wife denies any additional needs.       Expected Discharge Date and Time      Expected Discharge Date Expected Discharge Time     Dec 21, 2022         OLMAN Abdalla Katlynn, RN   Registered Nurse  Plan of Care     Signed  Date of Service:  12/20/22 1644  Creation Time:  12/20/22 1644     Signed        Goal Outcome Evaluation:  Plan of Care Reviewed With: patient  Progress: no change  Outcome Evaluation: Patient has complained of pain early in shift and recieved PRN Dilaudid x2, patient was started on PRN Norco and has recieved x2 and states his pain is much better on PRN Norco. Patient has had LR decreased from 125 to 75 ml/hr in IV fluids and has tolerated advancing onto a full liquid diet at this time during shift. Patient has shown no signs or symptoms of distress at this time and none are reported to RN during shift. Patient has call light within reach and is educated and aware of how to use at this time.                 Mandy Lacy MD   Physician  Gastroenterology  Progress Notes     Signed  Date of Service:  12/20/22 1918  Creation Time:  12/20/22 1918     Signed        Expand All [facility]  Inpatient Progress Note     Reason for Follow Up:  colitis        Subjective         Interval History:   Pt reports still with diarrhea but overall improved.  No melena, hematochezia.  Still with abd cramping.     Current Facility-Administered Medications:   •  amLODIPine (NORVASC) tablet 5 mg, 5 mg, Oral, Daily, Pete Ahmadi MD, 5 mg at 12/20/22 0810  •  cefTRIAXone (ROCEPHIN) IVPB 2 g, 2 g, Intravenous, Q24H, Pete Ahmadi MD, Last Rate: 100 mL/hr at 12/20/22 0328, 2 g at 12/20/22 0328  •  HYDROcodone-acetaminophen (NORCO) 5-325 MG per tablet 1 tablet, 1 tablet, Oral, Q4H PRN, Callum Birmingham DO, 1 tablet at 12/20/22 1606  •  HYDROmorphone (DILAUDID) injection 0.5 mg, 0.5 mg, Intravenous, Q3H PRN, Meliza  MD Patrick  •  ipratropium-albuterol (DUO-NEB) nebulizer solution 3 mL, 3 mL, Nebulization, Q6H PRN, Pete Ahmadi MD  •  lactated ringers infusion, 75 mL/hr, Intravenous, Continuous, Callum Birmingham DO, Last Rate: 75 mL/hr at 12/20/22 1453, 75 mL/hr at 12/20/22 1453  •  levothyroxine (SYNTHROID, LEVOTHROID) tablet 50 mcg, 50 mcg, Oral, Q AM, Pete Ahmadi MD, 50 mcg at 12/20/22 0610  •  melatonin tablet 10 mg, 10 mg, Oral, Nightly PRN, Christine Brizuela PA, 10 mg at 12/19/22 2026  •  naloxone (NARCAN) injection 0.4 mg, 0.4 mg, Intravenous, Q5 Min PRN, Patrick Haider MD  •  nicotine (NICODERM CQ) 7 MG/24HR patch 1 patch, 1 patch, Transdermal, Q24H, Pete Ahmadi MD  •  nitroglycerin (NITROSTAT) SL tablet 0.4 mg, 0.4 mg, Sublingual, Q5 Min PRN, Pete Ahmadi MD  •  sodium chloride 0.9 % flush 10 mL, 10 mL, Intravenous, PRN, Pete Ahmadi MD  •  sodium chloride 0.9 % flush 10 mL, 10 mL, Intravenous, PRN, Pete Ahmadi MD  •  sodium chloride 0.9 % flush 10 mL, 10 mL, Intravenous, Q12H, Pete Ahmadi MD, 10 mL at 12/19/22 2026  •  sodium chloride 0.9 % infusion 40 mL, 40 mL, Intravenous, PRN, Pete Ahmadi MD, 40 mL at 12/19/22 0321  Review of Systems:               The following systems were reviewed and negative;  respiratory and cardiovascular           Objective         Vital Signs  Temp:  [97.5 °F (36.4 °C)-98.6 °F (37 °C)] 98.2 °F (36.8 °C)  Heart Rate:  [54-69] 54  Resp:  [18-20] 20  BP: (117-141)/(74-93) 117/74  Body mass index is 24.85 kg/m².     Intake/Output Summary (Last 24 hours) at 12/20/2022 1918  Last data filed at 12/20/2022 1817      Gross per 24 hour   Intake 660 ml   Output --   Net 660 ml      No intake/output data recorded.                Physical Exam:              General: awake, alert and in no acute distress              Eyes: eyes move symmetrical in all directions, no scleral icterus              Neck: supple, trachea is midline               Skin: warm and dry, not jaundiced              Cardiovascular: no chest tenderness              Pulm: breathing unlabored              Abdomen: soft, nondistended, lower abd TTP              Rectal: deferred              Extremities: no rash or edema              Psychiatric: mental status within normal limits                 Results Review:                I reviewed the patient's new clinical results.            Results from last 7 days   Lab Units 12/20/22  0513 12/19/22  0507 12/18/22  1648   WBC 10*3/mm3 10.63 16.12* 19.63*   HEMOGLOBIN g/dL 13.8 14.1 14.8   HEMATOCRIT % 40.4 40.1 43.2   PLATELETS 10*3/mm3 269 269 278             Results from last 7 days   Lab Units 12/20/22  0513 12/19/22  0507 12/18/22  1648   SODIUM mmol/L 135* 135* 133*   POTASSIUM mmol/L 3.6 3.8 4.2   CHLORIDE mmol/L 101 102 98   CO2 mmol/L 24.6 20.8* 24.9   BUN mg/dL 9 8 7   CREATININE mg/dL 1.16 1.12 1.25   CALCIUM mg/dL 8.6 8.8 9.2   BILIRUBIN mg/dL 0.2 0.3 0.4   ALK PHOS U/L 130* 177* 110   ALT (SGPT) U/L 42* 64* 16   AST (SGOT) U/L 26 91* 16   GLUCOSE mg/dL 95 108* 118*                Lab Results   Lab Value Date/Time     LIPASE 17 12/18/2022 1648     LIPASE 30 06/01/2022 1501     LIPASE 23 04/15/2021 1043         Radiology:  [unfilled]              Assessment & Plan     Assessment:      Colitis  Salmonella     Plan:      • On ceftriaxone; ok to continue as inpatient.  Would transition to a fluroquinolone at discharge  • Discussed f/u colonoscopy as outpatient to document healing of colitis.  • Will have my office arrange f/u colonoscopy in 6 - 8 weeks.  My office to arrange.  • Please call if can be of further assistance     I discussed the patients findings and my recommendations with patient and family.        Mandy Lacy M.D.  [place] [address] [place]  [address]  Office: [phone]                                      Vanesa Gavin, RN   Registered Nurse  Plan of Care      Signed  Date of Service:  12/21/22 0550  Creation Time:  12/21/22 0550     Signed        Goal Outcome Evaluation:  Progress: no change  Outcome Evaluation: Norco given for pain, pain managed well per patient. Continuous fluids running at 75mL/hr as ordered. Vital signs stable, will continue to monitor.

## 2022-12-22 ENCOUNTER — TRANSITIONAL CARE MANAGEMENT TELEPHONE ENCOUNTER (OUTPATIENT)
Dept: CALL CENTER | Facility: HOSPITAL | Age: 42
End: 2022-12-22

## 2022-12-22 NOTE — OUTREACH NOTE
Call Center TCM Note    Flowsheet Row Responses   [facility] facility patient discharged from? [facility]   Does the patient have one of the following disease processes/diagnoses(primary or secondary)? Other   TCM attempt successful? No  [No verbal release however according to CM notes wife was at bedside]   Unsuccessful attempts Attempt 1   Call Status Left message          Odessa Douglas RN    12/22/2022, 14:49 EST

## 2022-12-22 NOTE — OUTREACH NOTE
Call Center TCM Note    Flowsheet Row Responses   [facility] facility patient discharged from? [facility]   Does the patient have one of the following disease processes/diagnoses(primary or secondary)? Other   TCM attempt successful? No   Unsuccessful attempts Attempt 2          Odessa Douglas RN    12/22/2022, 16:28 EST

## 2022-12-23 ENCOUNTER — TRANSITIONAL CARE MANAGEMENT TELEPHONE ENCOUNTER (OUTPATIENT)
Dept: CALL CENTER | Facility: HOSPITAL | Age: 42
End: 2022-12-23

## 2022-12-23 NOTE — OUTREACH NOTE
Call Center TCM Note    Flowsheet Row Responses   [facility] patient discharged from? [facility]   Does the patient have one of the following disease processes/diagnoses(primary or secondary)? Other   TCM attempt successful? No   Unsuccessful attempts Attempt 3          Jose Barney RN    12/23/2022, 10:55 EST

## 2022-12-27 ENCOUNTER — OFFICE VISIT (OUTPATIENT)
Dept: FAMILY MEDICINE CLINIC | Facility: CLINIC | Age: 42
End: 2022-12-27

## 2022-12-27 VITALS
DIASTOLIC BLOOD PRESSURE: 72 MMHG | OXYGEN SATURATION: 98 % | WEIGHT: 200 LBS | TEMPERATURE: 98.1 F | SYSTOLIC BLOOD PRESSURE: 128 MMHG | HEART RATE: 70 BPM | HEIGHT: 74 IN | BODY MASS INDEX: 25.67 KG/M2

## 2022-12-27 DIAGNOSIS — F17.210 CIGARETTE NICOTINE DEPENDENCE WITHOUT COMPLICATION: ICD-10-CM

## 2022-12-27 DIAGNOSIS — Z09 HOSPITAL DISCHARGE FOLLOW-UP: Primary | ICD-10-CM

## 2022-12-27 DIAGNOSIS — A09 DIARRHEA OF INFECTIOUS ORIGIN: ICD-10-CM

## 2022-12-27 PROCEDURE — 99214 OFFICE O/P EST MOD 30 MIN: CPT

## 2022-12-27 PROCEDURE — 1111F DSCHRG MED/CURRENT MED MERGE: CPT

## 2022-12-27 NOTE — PROGRESS NOTES
Donte Stallings is a 42 y.o. male admitted to [facility] and  is seen for follow up.  Chief Complaint   Patient presents with   • Hospital Follow Up Visit     Patient presents today to follow-up after hospital discharge.  He was admitted with pancolitis, positive for Salmonella.  Patient reports that he suspects Salmonella came from his pet snake.  He reports that overall he is feeling much better, having 1 bowel movement daily that he describes as loose but much improved from previously.  Date of TCM Phone Call 12/21/2022   [facility]   Date of Admission 12/18/2022   Date of Discharge 12/21/2022   Discharge Disposition Home or Self Care     Discharge summary is available.  Current outpatient and discharge medications have been reconciled for the patient.  Reviewed by: NANCIE Carreno    He was admitted for the following reason(s): abdominal pain and diarrhea for 5-6 days  Primary admitting diagnosis at discharge was pancolitis.  Pertinent secondary diagnoses include   Salmonella  Severe diarrhea  COPD/asthma, not acutely exacerbated  Chronic ongoing tobacco abuse with cigarettes  Hypothyroidism  Essential hypertension  Procedures performed while hospitalized:Procedures Performed in Hospital: IV fluids, IV Antibiotics with Rocephin and Flagyl and CT of Abdomen/Pelvis  Pending results:  Pending tests: none  Pain Control:  well controlled  Diet: As tolerated  Activity after Discharge: activity as tolerated  Items to be addressed at first follow up visit include:  1. Medication changes:  Discharge Medications            New Medications      Instructions Start Date   docusate sodium 100 MG capsule  Commonly known as: Colace    100 mg, Oral, 2 Times Daily        HYDROcodone-acetaminophen 5-325 MG per tablet  Commonly known as: NORCO    1 tablet, Oral, Every 4 Hours PRN        levoFLOXacin 750 MG tablet  Commonly known as: Levaquin    750 mg, Oral, Daily        nicotine 7 MG/24HR  "patch  Commonly known as: NICODERM CQ    1 patch, Transdermal, Every 24 Hours Scheduled        polyethylene glycol 236 g solution  Commonly known as: GoLYTELY    Take as directed                      Continue These Medications      Instructions Start Date   amLODIPine 5 MG tablet  Commonly known as: NORVASC    5 mg, Oral, Daily        levothyroxine 50 MCG tablet  Commonly known as: SYNTHROID, LEVOTHROID    50 mcg, Oral, Daily           He reports his overall condition is are improving since discharge.  No specific complaints.  Social support is said to be adequate to meet his needs.     Objective   Vitals:    12/27/22 1543   BP: 128/72   Pulse: 70   Temp: 98.1 °F (36.7 °C)   SpO2: 98%   Weight: 90.7 kg (200 lb)   Height: 188 cm (74\")     Body mass index is 25.68 kg/m².  Physical Exam  Vitals reviewed.   Constitutional:       Appearance: Normal appearance. He is well-developed.   HENT:      Head: Normocephalic and atraumatic.      Right Ear: External ear normal.      Left Ear: External ear normal.      Mouth/Throat:      Pharynx: No oropharyngeal exudate.   Eyes:      Conjunctiva/sclera: Conjunctivae normal.      Pupils: Pupils are equal, round, and reactive to light.   Cardiovascular:      Rate and Rhythm: Normal rate and regular rhythm.      Heart sounds: No murmur heard.    No friction rub. No gallop.   Pulmonary:      Effort: Pulmonary effort is normal.      Breath sounds: Normal breath sounds. No wheezing or rhonchi.   Abdominal:      General: Bowel sounds are normal. There is no distension.      Palpations: Abdomen is soft.      Tenderness: There is no abdominal tenderness.   Skin:     General: Skin is warm and dry.   Neurological:      Mental Status: He is alert and oriented to person, place, and time.      Cranial Nerves: No cranial nerve deficit.   Psychiatric:         Mood and Affect: Mood and affect normal.         Behavior: Behavior normal.         Thought Content: Thought content normal.         " Judgment: Judgment normal.         Assessment & Plan   Problem List Items Addressed This Visit    None  Visit Diagnoses     Hospital discharge follow-up    -  Primary    Diarrhea of infectious origin        Cigarette nicotine dependence without complication        Relevant Medications    nicotine polacrilex (NICORETTE) 4 MG gum        Patient has completed oral antibiotic coverage of Levaquin as prescribed.  He was advised to continue taking previous medications, amlodipine 5 mg daily and levothyroxine 50 mcg daily.  He also has Colace to be taken twice daily prescribed by gastroenterology and was educated on proper preparation for colonoscopy procedure in February.  He is interested in smoking cessation and would like to try nicotine gum to assist him with this.  Patient was educated on importance of proper hand hygiene with snake handling.  He verbalized understanding.

## 2023-01-03 LAB
C COLI+JEJ+UPSA DNA STL QL NAA+NON-PROBE: NOT DETECTED
EC STX1+STX2 GENES STL QL NAA+NON-PROBE: NOT DETECTED
S ENT+BONG DNA STL QL NAA+NON-PROBE: DETECTED
SHIGELLA SP+EIEC IPAH ST NAA+NON-PROBE: NOT DETECTED

## 2023-01-23 NOTE — OP NOTE
OP NOTE  VENTRAL HERNIA REPAIR LAPAROSCOPIC WITH DAVINCI ROBOT  Procedure Report    Patient Name:  Donte Stallings  YOB: 1980  [id number]    Date of Surgery:  6/15/2022     Indications: See last clinic note for indications, discussion of risk benefits and alternatives    Pre-op Diagnosis:   Incisional hernia, without obstruction or gangrene [K43.2]       Post-Op Diagnosis Codes:     * Incisional hernia, without obstruction or gangrene [K43.2]    Procedure/CPT® Codes:      Procedure(s): Robotic repair of initial reducible umbilical hernia and initial reducible incisional hernia with mesh    Staff:  Surgeon(s):  Tay Ash MD    Assistant: Ida Grande CSA    Anesthesia: General, Local    Estimated Blood Loss: 10 mL    Implants:    Implant Name Type Inv. Item Serial No.  Lot No. LRB No. Used Action   DEV CLS WND VLOC/90 REILLY ABS 1/2CIR SZ3/0 26MM 23CM ALYSSIA - [id number] Implant DEV CLS WND VLOC/90 REILLY ABS 1/2CIR SZ3/0 26MM 23CM ALYSSIA  COVIDIEN [id number] N/A 1 Implanted   DEV CLS WND VLOC/90 REILLY ABS 1/2CIR SZ3/0 26MM 23CM ALYSSIA - [id number] Implant DEV CLS WND VLOC/90 REILLY ABS 1/2CIR SZ3/0 26MM 23CM ALYSSIA  COVIDIEN [id number] N/A 1 Implanted   DEV CLS WND VLOC/PBT REILLY NONABS 1/2CIR SZ0 37MM 23CM ROSALEE - [id number] Implant DEV CLS WND VLOC/PBT REILLY NONABS 1/2CIR SZ0 37MM 23CM ROSALEE  COVIDIEN [id number] N/A 1 Implanted   MESH VENTRALIGHT ST CIR 4.5IN - [id number] Implant MESH VENTRALIGHT ST CIR 4.5IN  DAVOL  (DIV OF [employer]) [id number] N/A 1 Implanted       Specimen:          None      Findings: 2 x 2 centimeter epigastric incisional hernia and 1 x 1 cm umbilical hernia repaired with V-Loc suture followed by 11 cm ventralight ST mesh in underlay fashion secured with V-Loc suture.    Complications: None    Description of Procedure: After all questions were answered, consent was verified.  He was brought the operating room per stretcher placed in supine position arms out all extremities  padded.  Bilateral lower extremity SCDs placed.  General tracheal anesthesia induced.  Preoperative IV antibiotics administered.  Patient's bilateral upper extremities padded and tucked appropriately.  Patient's abdominal hair removed with clippers.  Patient's abdomen prepped with ChloraPrep.  Waited 3 minutes.  Draped in usual sterile fashion.  Ioban applied.  Critical timeout taken.  Began the procedure with a stab incision at Sebastian's point.  I placed a Veress needle.  Positive saline drop test.  I obtain pneumoperitoneum with CO2 insufflation.  I rotated the patient to the right.  I made a transverse incision in the left midabdomen.  I placed a robotic 8 trocar and Optiview fashion.  No injury to viscera below from entry into the abdomen.  The Veress needle was removed.  I then placed a robotic 8 trocar under direct vision in the left upper quadrant.  I placed a robotic 12 trocar under direct vision in the left lower quadrant.  I then infiltrated bilateral upper quadrants and a tap block fashion with local anesthesia.  We then docked the robot and placed instruments.  I then performed sharp lysis of adhesions to take down omental adhesions from the midline.  The patient had a 2 x 2 centimeter epigastric incisional hernia.  The falciform ligament was taken down with the anterior abdominal wall cleared of intra-abdominal fat for better mesh adherence.  I scored the edges of the hernia.  The patient also had a 1 x 1 cm umbilical hernia.  I closed the incisional hernia and umbilical hernia with V-Loc suture.  I then scrubbed back in and placed a chandelier Prolene stitch and 11 cm ventralight ST mesh.  The mesh was then placed in the abdomen.  We then secured this to the anterior abdominal wall using a Reji Vargas device.  I then secured the mesh circumferentially with running V-Loc suture.  I then secured the midline of the mesh with V-Loc suture.  We removed needles and obtained a correct count.  We removed  instruments undocked the robot.  I scrubbed back in.  I closed the 12 trocar fascia with the Reji Vargas device with 0 Vicryl suture.  We desufflated the abdomen and remove the trochars.  I cut the stitch where it exited the abdomen that was used to pull up the mesh.  Surgical first assist closed all incisions with Monocryl and skin glue.  At the end of the procedure all counts were correct.  I was present for the procedure.  Surgical first assist present scrubbed and helped with key portions of the case.    Tay Ash MD     Date: 6/15/2022  Time: 09:08 EDT   87F nonverbal with dementia, hx colon ca with resection, recently hospitalization for sacral decub requiring debridement as well as newly dx bilateral PE, comes to hospital today from [facility] for abnormally low hemoglobin in setting of melena    #Acute blood loss anemia secondary to GI vs wound vac jade  - h/h stable  - no further bleed/ melena reported  - changed IV PPI BID to PO  - GI consult: noted. no intervention planned. advanced die to minced and thin. (usually on minced diet, thin liquids)  -s/p 1U PRBC with great response  -CBC in AM  -Hold ASA indefinitely  - resume Lovenox BID for now. change to Eliquis in am     #History of bilateral PE (diagnosed in Dec 2022)  #History of right femoral DVT  -Eliquis currently on hold -  started on lovenox. will resume Eliquis  in AM if H/H stable... but would consider to resume at reduced dose of 2.5 mg bid, even though this is "suboptimal" - based on benefits/risks discussion with patient's daughter (who is in favor of reduced dose)  -No class I indication for ASA idenified.... was not on this prior to rehab (per daughter) ... no hx CAD or CVA or PAD .... should not resume this at all, because of increased bleeding risk with Eliquis    #COVID19 infection  -Diagnosed at SNF .... completed Paxlovid.... date of first positive test unclear.... support care    #Dementia without behavioral disturbance  #Nonverbal due to above  -Supportive care    #Sacral wound with wound vac in place  -Dr. Liza Iglesias following   -defer wound care to surgery team  - needs to change wound vac. surgery will change tomorrow. spoke to maritza MALLOY    #Hypokalemia  -Replete    #HTN  -hypotension in prior admission - started on Midodrine... . but now with hypertension  - off midodrine, monitor BPs . resume anti-HTN    DVT ppx: lovenox.     will discuss Case with patient's daughter, Bruno, who is also a physician, [phone],   Advanced Care Planning: DNR/DNI  Dispo: anticipates DC to [facility] in am if h/h stable and after wound vac change   87F nonverbal with dementia, hx colon ca with resection, recently hospitalization for sacral decub requiring debridement as well as newly dx bilateral PE, comes to hospital today from [facility] for abnormally low hemoglobin in setting of melena    #Acute blood loss anemia secondary to GI vs wound vac jade  - h/h stable  - no further bleed/ melena reported  - changed IV PPI BID to PO  - GI consult: noted. no intervention planned. advanced die to minced and thin. (usually on minced diet, thin liquids)  -s/p 1U PRBC with great response  -CBC in AM  -Hold ASA indefinitely  - resume  Eliquis     #History of bilateral PE (diagnosed in Dec 2022)  #History of right femoral DVT  -Eliquis resumed at lower dose of 2.5 mg BID as per discussion with daughter by . daughter who is a MD also favours lower dose considering risk/benefit/alt.   -No class I indication for ASA idenified.... was not on this prior to rehab (per daughter) ... no hx CAD or CVA or PAD .... should not resume this at all, because of increased bleeding risk with Eliquis    #COVID19 infection  -Diagnosed at SNF .... completed Paxlovid.... date of first positive test unclear.... support care    #Dementia without behavioral disturbance  #Nonverbal due to above  -Supportive care    #Sacral wound with wound vac in place  -Dr. Liza Iglesias following   -defer wound care to surgery team  - needs to change wound vac. surgery will change tomorrow. spoke to maritza MALLOY    #Hypokalemia  -Replete    #HTN  -hypotension in prior admission - started on Midodrine... . but now with hypertension  - off midodrine, monitor BPs . resume anti-HTN    DVT ppx: lovenox.     called  patient's daughter, Burno, who is also a physician, [phone], left  to call back 1/23 at 5:37 pm  Advanced Care Planning: DNR/DNI  Dispo: anticipates DC to [facility] in am if h/h stable and after wound vac change   87F nonverbal with dementia, hx colon ca with resection, recently hospitalization for sacral decub requiring debridement as well as newly dx bilateral PE, comes to hospital today from [facility] for abnormally low hemoglobin in setting of melena    #Acute blood loss anemia secondary to GI vs wound vac jade  - h/h stable  - no further bleed/ melena reported  - changed IV PPI BID to PO  - GI consult: noted. no intervention planned. advanced die to minced and thin. (usually on minced diet, thin liquids)  -s/p 1U PRBC with great response  -CBC in AM  -Hold ASA indefinitely  - resume  Eliquis     #History of bilateral PE (diagnosed in Dec 2022)  #History of right femoral DVT  -Eliquis resumed at lower dose of 2.5 mg BID as per discussion with daughter by . daughter who is a MD also favours lower dose considering risk/benefit/alt.   -No class I indication for ASA idenified.... was not on this prior to rehab (per daughter) ... no hx CAD or CVA or PAD .... should not resume this at all, because of increased bleeding risk with Eliquis    #COVID19 infection  -Diagnosed at SNF .... completed Paxlovid.... date of first positive test unclear.... support care    #Dementia without behavioral disturbance  #Nonverbal due to above  -Supportive care    #Sacral wound with wound vac in place  -Dr. Liza Iglesias following   -defer wound care to surgery team  - needs to change wound vac. surgery will change tomorrow. spoke to maritza MALLOY    #Hypokalemia  -Replete    #HTN  -hypotension in prior admission - started on Midodrine... . but now with hypertension  - off midodrine, monitor BPs . resume anti-HTN    DVT ppx: eliquis    called  patient's daughter, Bruno, who is also a physician, [phone], left  to call back 1/23 at 5:37 pm  Advanced Care Planning: DNR/DNI  Dispo: anticipates DC to [facility] in am if h/h stable and after wound vac change. covid pcr ordered

## 2023-02-01 ENCOUNTER — TELEPHONE (OUTPATIENT)
Dept: GASTROENTEROLOGY | Facility: CLINIC | Age: 43
End: 2023-02-01
Payer: COMMERCIAL

## 2023-02-15 ENCOUNTER — TELEPHONE (OUTPATIENT)
Dept: GASTROENTEROLOGY | Facility: CLINIC | Age: 43
End: 2023-02-15
Payer: COMMERCIAL

## 2023-02-15 NOTE — TELEPHONE ENCOUNTER
Attempted to contact pt in regards to missed colonoscopy. Left a vm requesting a return call to reschedule. Certified letter mailed.

## 2023-02-20 ENCOUNTER — TELEPHONE (OUTPATIENT)
Dept: GASTROENTEROLOGY | Facility: CLINIC | Age: 43
End: 2023-02-20
Payer: COMMERCIAL

## 2023-02-22 RX ORDER — POLYETHYLENE GLYCOL-3350 AND ELECTROLYTES 236; 6.74; 5.86; 2.97; 22.74 G/274.31G; G/274.31G; G/274.31G; G/274.31G; G/274.31G
POWDER, FOR SOLUTION ORAL
Qty: 4000 ML | Refills: 0 | Status: ON HOLD | OUTPATIENT
Start: 2023-02-22 | End: 2023-02-24

## 2023-02-24 ENCOUNTER — ANESTHESIA EVENT (OUTPATIENT)
Dept: GASTROENTEROLOGY | Facility: HOSPITAL | Age: 43
End: 2023-02-24
Payer: COMMERCIAL

## 2023-02-24 ENCOUNTER — HOSPITAL ENCOUNTER (OUTPATIENT)
Facility: HOSPITAL | Age: 43
Setting detail: HOSPITAL OUTPATIENT SURGERY
Discharge: HOME | End: 2023-02-24
Attending: INTERNAL MEDICINE | Admitting: INTERNAL MEDICINE
Payer: COMMERCIAL

## 2023-02-24 ENCOUNTER — ANESTHESIA (OUTPATIENT)
Dept: GASTROENTEROLOGY | Facility: HOSPITAL | Age: 43
End: 2023-02-24
Payer: COMMERCIAL

## 2023-02-24 VITALS
TEMPERATURE: 97.8 F | WEIGHT: 200.62 LBS | BODY MASS INDEX: 25.75 KG/M2 | DIASTOLIC BLOOD PRESSURE: 69 MMHG | HEIGHT: 74 IN | RESPIRATION RATE: 14 BRPM | HEART RATE: 57 BPM | SYSTOLIC BLOOD PRESSURE: 125 MMHG | OXYGEN SATURATION: 98 %

## 2023-02-24 PROCEDURE — 45378 DIAGNOSTIC COLONOSCOPY: CPT | Performed by: INTERNAL MEDICINE

## 2023-02-24 PROCEDURE — 25010000002 PROPOFOL 10 MG/ML EMULSION: Performed by: NURSE ANESTHETIST, CERTIFIED REGISTERED

## 2023-02-24 RX ORDER — PROPOFOL 10 MG/ML
VIAL (ML) INTRAVENOUS AS NEEDED
Status: DISCONTINUED | OUTPATIENT
Start: 2023-02-24 | End: 2023-02-24 | Stop reason: SURG

## 2023-02-24 RX ORDER — LIDOCAINE HYDROCHLORIDE 20 MG/ML
INJECTION, SOLUTION EPIDURAL; INFILTRATION; INTRACAUDAL; PERINEURAL AS NEEDED
Status: DISCONTINUED | OUTPATIENT
Start: 2023-02-24 | End: 2023-02-24 | Stop reason: SURG

## 2023-02-24 RX ORDER — SODIUM CHLORIDE, SODIUM LACTATE, POTASSIUM CHLORIDE, CALCIUM CHLORIDE 600; 310; 30; 20 MG/100ML; MG/100ML; MG/100ML; MG/100ML
30 INJECTION, SOLUTION INTRAVENOUS CONTINUOUS
Status: DISCONTINUED | OUTPATIENT
Start: 2023-02-24 | End: 2023-02-24 | Stop reason: HOSPADM

## 2023-02-24 RX ADMIN — LIDOCAINE HYDROCHLORIDE 40 MG: 20 INJECTION, SOLUTION EPIDURAL; INFILTRATION; INTRACAUDAL; PERINEURAL at 11:30

## 2023-02-24 RX ADMIN — PROPOFOL 250 MCG/KG/MIN: 10 INJECTION, EMULSION INTRAVENOUS at 11:30

## 2023-02-24 RX ADMIN — SODIUM CHLORIDE, POTASSIUM CHLORIDE, SODIUM LACTATE AND CALCIUM CHLORIDE 30 ML/HR: 600; 310; 30; 20 INJECTION, SOLUTION INTRAVENOUS at 11:17

## 2023-02-24 RX ADMIN — PROPOFOL 50 MG: 10 INJECTION, EMULSION INTRAVENOUS at 11:30

## 2023-02-24 NOTE — ANESTHESIA PREPROCEDURE EVALUATION
Anesthesia Evaluation     Patient summary reviewed and Nursing notes reviewed   no history of anesthetic complications:  NPO Solid Status: > 8 hours  NPO Liquid Status: > 2 hours           Airway   Mallampati: II  TM distance: >3 FB  Neck ROM: full  No difficulty expected  Dental    (+) poor dentition    Pulmonary - normal exam    breath sounds clear to auscultation  (+) a smoker Current, COPD,   Cardiovascular - normal exam  Exercise tolerance: good (4-7 METS)    Rhythm: regular  Rate: normal    (+) hypertension, valvular problems/murmurs murmur, hyperlipidemia,       Neuro/Psych- negative ROS  GI/Hepatic/Renal/Endo    (+)   renal disease (s/p nephrectomy), thyroid problem hypothyroidism    Musculoskeletal     Abdominal    Substance History      OB/GYN          Other      history of cancer remission    ROS/Med Hx Other: PAT Nursing Notes unavailable.                    Anesthesia Plan    ASA 3     general   total IV anesthesia  (Patient understands anesthesia not responsible for dental damage.)  intravenous induction     Anesthetic plan, risks, benefits, and alternatives have been provided, discussed and informed consent has been obtained with: patient.    Plan discussed with CRNA.        CODE STATUS:

## 2023-02-24 NOTE — ANESTHESIA POSTPROCEDURE EVALUATION
Patient: Donte Stallings    Procedure Summary     Date: 02/24/23 Room / Location: [facility] ENDOSCOPY 1 / [facility] ENDOSCOPY    Anesthesia Start: 1129 Anesthesia Stop: 1145    Procedure: SIGMOIDOSCOPY FLEXIBLE Diagnosis:       Colitis      (Colitis [K52.9])    Surgeons: Mandy Lacy MD Provider: Jose Guillen MD    Anesthesia Type: general ASA Status: 3          Anesthesia Type: general    Vitals  Vitals Value Taken Time   /69 02/24/23 1157   Temp 36.6 °C (97.8 °F) 02/24/23 1156   Pulse 59 02/24/23 1200   Resp 14 02/24/23 1156   SpO2 99 % 02/24/23 1200   Vitals shown include unvalidated device data.        Post Anesthesia Care and Evaluation    Patient location during evaluation: bedside  Patient participation: complete - patient participated  Level of consciousness: awake  Pain management: adequate    Airway patency: patent  Anesthetic complications: No anesthetic complications  PONV Status: none  Cardiovascular status: acceptable and stable  Respiratory status: acceptable  Hydration status: acceptable    Comments: An Anesthesiologist personally participated in the most demanding procedures (including induction and emergence if applicable) in the anesthesia plan, monitored the course of anesthesia administration at frequent intervals and remained physically present and available for immediate diagnosis and treatment of emergencies.

## 2023-03-01 ENCOUNTER — PREP FOR SURGERY (OUTPATIENT)
Dept: OTHER | Facility: HOSPITAL | Age: 43
End: 2023-03-01
Payer: COMMERCIAL

## 2023-03-01 ENCOUNTER — TELEPHONE (OUTPATIENT)
Dept: GASTROENTEROLOGY | Facility: CLINIC | Age: 43
End: 2023-03-01
Payer: COMMERCIAL

## 2023-03-01 DIAGNOSIS — R93.89 ABNORMAL CT SCAN: Primary | ICD-10-CM

## 2023-03-01 NOTE — TELEPHONE ENCOUNTER
Please arrange repeat colonoscopy anytime in the next 2 months due to poor bowel prep.  Indication: abnormal CT.

## 2023-03-07 NOTE — TELEPHONE ENCOUNTER
Attempted to contact pt to schedule appt. Left a vm requesting a return call. Unable to reach pt after multiple attempts. Letter mailed.

## 2023-03-17 PROBLEM — R93.89 ABNORMAL CT SCAN: Status: ACTIVE | Noted: 2023-03-17

## 2023-03-17 NOTE — TELEPHONE ENCOUNTER
Patient has scheduled for 5/25/2023.  Prep to be sent to [facility] in [place].   Pt does not require any clearances.   Instructions mailed.

## 2023-05-22 ENCOUNTER — TELEPHONE (OUTPATIENT)
Dept: GASTROENTEROLOGY | Facility: CLINIC | Age: 43
End: 2023-05-22
Payer: COMMERCIAL

## 2023-05-22 ENCOUNTER — TELEPHONE (OUTPATIENT)
Dept: FAMILY MEDICINE CLINIC | Facility: CLINIC | Age: 43
End: 2023-05-22
Payer: COMMERCIAL

## 2023-05-22 DIAGNOSIS — I10 ESSENTIAL HYPERTENSION: ICD-10-CM

## 2023-05-22 NOTE — TELEPHONE ENCOUNTER
Caller: DILCIA FRASER (ON VERBAL FOR GEN SURGERY NOT PRIMARY CARE)    Relationship: Emergency Contact    Best call back number: [phone]    Requested Prescriptions:    amLODIPine (NORVASC) 5 MG tablet    (PSORIASIS CREAM NOT IN LIST AND PATIENT'S WIFE COULD NOT RECALL NAME BUT SAID IT WAS NOT ANTI-ITCH CREAM)      Pharmacy where request should be sent: [id number] - [address] AT SEC OF  & [facility] [phone] [facility] [phone] FX     Last office visit with prescribing clinician: 6/7/2022   Last telemedicine visit with prescribing clinician: 12/27/2022   Next office visit with prescribing clinician: Visit date not found     Additional details provided by patient: PSORIASIS CREAM NOT IN LIST    Does the patient have less than a 3 day supply:  [x] Yes  [] No    Would you like a call back once the refill request has been completed: [x] Yes [] No    If the office needs to give you a call back, can they leave a voicemail: [x] Yes [] No    Obed Sargent Rep   05/22/23 09:00 EDT

## 2023-05-22 NOTE — TELEPHONE ENCOUNTER
Patient spouse Shell contacted the office to get clarification for prep before colonoscopy scheduled on 05/25/2023 due to this being a repeat because patient wasn't clear first attempt.    Spoke with MA to confirm about the extended prep.  Advised patient spouse that on 05/24/2023 patient will be on a a liquid diet all day nothing red, purple or dairy. Then at noon he will take 4 dulcolax tablets and 60 mL milk of magnesia. Then at 6:00 pm on the 24th he will drink 2/3 of the bowel prep and the remainder he will drink 4 hours before he arrives at the hospital. Shell asked about the arrival time, looking at the scheduled estimated arrival time would be 12:00 pm but informed that a day or two before a PAT nurse would call and confirm that.  Patient spouse asked about medication that he takes first thing in the morning and then eat 30 minutes later.  Informed that when the nurse calls to get registered that they would go over all medication at that time and advise if they need to be held or if they can be taken.

## 2023-05-22 NOTE — SIGNIFICANT NOTE
Arrival time of 0630.    Must have a  for transportation post prodecure, as well as photo ID and insurance card.    Education provided on laxative administration; bowel prep to be taken in two doses. Reviweed diet instructions for day prior to procedure. Only plain, unflavored water after midnight until two hours prior to arrival time.    Do not take any morning medications of the day and the procedure. Instead, bring all prescribed medications and inhalers to the hospital on the morning of the procedure.     Patient verbalized understanding of instructions.

## 2023-05-23 DIAGNOSIS — I10 ESSENTIAL HYPERTENSION: ICD-10-CM

## 2023-05-23 RX ORDER — CLOBETASOL PROPIONATE 0.5 MG/G
CREAM TOPICAL
COMMUNITY
Start: 2023-02-06 | End: 2023-05-23 | Stop reason: SDUPTHER

## 2023-05-23 RX ORDER — CLOBETASOL PROPIONATE 0.5 MG/G
CREAM TOPICAL 2 TIMES DAILY
Qty: 15 G | Refills: 0 | Status: SHIPPED | OUTPATIENT
Start: 2023-05-23

## 2023-05-23 RX ORDER — AMLODIPINE BESYLATE 5 MG/1
5 TABLET ORAL DAILY
Qty: 30 TABLET | Refills: 0 | Status: SHIPPED | OUTPATIENT
Start: 2023-05-23

## 2023-05-23 NOTE — TELEPHONE ENCOUNTER
Made pt an appt for June 29th and sent in a 30 day supply of both medications as he is due for a follow up and has not been seen in a year.

## 2023-05-25 ENCOUNTER — ANESTHESIA EVENT (OUTPATIENT)
Dept: GASTROENTEROLOGY | Facility: HOSPITAL | Age: 43
End: 2023-05-25
Payer: COMMERCIAL

## 2023-05-25 ENCOUNTER — HOSPITAL ENCOUNTER (OUTPATIENT)
Facility: HOSPITAL | Age: 43
Setting detail: HOSPITAL OUTPATIENT SURGERY
Discharge: HOME | End: 2023-05-25
Attending: INTERNAL MEDICINE | Admitting: INTERNAL MEDICINE
Payer: COMMERCIAL

## 2023-05-25 ENCOUNTER — ANESTHESIA (OUTPATIENT)
Dept: GASTROENTEROLOGY | Facility: HOSPITAL | Age: 43
End: 2023-05-25
Payer: COMMERCIAL

## 2023-05-25 VITALS
SYSTOLIC BLOOD PRESSURE: 119 MMHG | HEIGHT: 75 IN | BODY MASS INDEX: 24.67 KG/M2 | DIASTOLIC BLOOD PRESSURE: 85 MMHG | RESPIRATION RATE: 19 BRPM | HEART RATE: 62 BPM | TEMPERATURE: 96.8 F | OXYGEN SATURATION: 98 % | WEIGHT: 198.41 LBS

## 2023-05-25 DIAGNOSIS — R93.89 ABNORMAL CT SCAN: ICD-10-CM

## 2023-05-25 PROCEDURE — 88305 TISSUE EXAM BY PATHOLOGIST: CPT | Performed by: INTERNAL MEDICINE

## 2023-05-25 PROCEDURE — 25010000002 PROPOFOL 10 MG/ML EMULSION: Performed by: NURSE ANESTHETIST, CERTIFIED REGISTERED

## 2023-05-25 PROCEDURE — 45385 COLONOSCOPY W/LESION REMOVAL: CPT | Performed by: INTERNAL MEDICINE

## 2023-05-25 RX ORDER — LIDOCAINE HYDROCHLORIDE 20 MG/ML
INJECTION, SOLUTION EPIDURAL; INFILTRATION; INTRACAUDAL; PERINEURAL AS NEEDED
Status: DISCONTINUED | OUTPATIENT
Start: 2023-05-25 | End: 2023-05-25 | Stop reason: SURG

## 2023-05-25 RX ORDER — PROPOFOL 10 MG/ML
VIAL (ML) INTRAVENOUS AS NEEDED
Status: DISCONTINUED | OUTPATIENT
Start: 2023-05-25 | End: 2023-05-25 | Stop reason: SURG

## 2023-05-25 RX ORDER — SODIUM CHLORIDE, SODIUM LACTATE, POTASSIUM CHLORIDE, CALCIUM CHLORIDE 600; 310; 30; 20 MG/100ML; MG/100ML; MG/100ML; MG/100ML
30 INJECTION, SOLUTION INTRAVENOUS CONTINUOUS
Status: DISCONTINUED | OUTPATIENT
Start: 2023-05-25 | End: 2023-05-25 | Stop reason: HOSPADM

## 2023-05-25 RX ADMIN — PROPOFOL 150 MCG/KG/MIN: 10 INJECTION, EMULSION INTRAVENOUS at 07:56

## 2023-05-25 RX ADMIN — LIDOCAINE HYDROCHLORIDE 100 MG: 20 INJECTION, SOLUTION EPIDURAL; INFILTRATION; INTRACAUDAL; PERINEURAL at 07:56

## 2023-05-25 RX ADMIN — SODIUM CHLORIDE, POTASSIUM CHLORIDE, SODIUM LACTATE AND CALCIUM CHLORIDE: 600; 310; 30; 20 INJECTION, SOLUTION INTRAVENOUS at 07:51

## 2023-05-25 RX ADMIN — PROPOFOL 150 MG: 10 INJECTION, EMULSION INTRAVENOUS at 07:56

## 2023-05-25 NOTE — ANESTHESIA PREPROCEDURE EVALUATION
Anesthesia Evaluation     Patient summary reviewed and Nursing notes reviewed   no history of anesthetic complications:  NPO Solid Status: > 8 hours  NPO Liquid Status: > 2 hours           Airway   Mallampati: II  TM distance: >3 FB  Neck ROM: full  No difficulty expected  Dental      Pulmonary - normal exam    breath sounds clear to auscultation  (+) COPD, asthma,shortness of breath,   Cardiovascular - normal exam  Exercise tolerance: good (4-7 METS)    Rhythm: regular  Rate: normal    (+) hypertension, valvular problems/murmurs,       Neuro/Psych  (+) dizziness/light headedness, numbness, psychiatric history,    GI/Hepatic/Renal/Endo    (+)   renal disease, thyroid problem hypothyroidism    Musculoskeletal (-) negative ROS    Abdominal    Substance History - negative use     OB/GYN negative ob/gyn ROS         Other      history of cancer    ROS/Med Hx Other: PAT Nursing Notes unavailable.                   Anesthesia Plan    ASA 2     general     (Total IV Anesthesia    Patient understands anesthesia not responsible for dental damage.  )  intravenous induction     Anesthetic plan, risks, benefits, and alternatives have been provided, discussed and informed consent has been obtained with: patient.    Plan discussed with CRNA.        CODE STATUS:

## 2023-05-25 NOTE — H&P
Pre Procedure History & Physical    Chief Complaint:   F/u of colitis    Subjective     HPI:   44 yo M here for f/u of colitis.    Past Medical History:   Past Medical History:   Diagnosis Date   • Adrenal mass    • Allergies    • Anxiety    • Asthma    • Bipolar 1 disorder    • Broken bones    • Depression    • Emphysema/COPD    • Head injury    • Heart murmur    • High blood pressure    • Lumbago 11/17/2015   • Lumbar spinal stenosis 11/17/2015    L4-5   • Renal cancer 05/17/2021   • Renal mass    • Skin disease    • SOB (shortness of breath)        Past Surgical History:  Past Surgical History:   Procedure Laterality Date   • HAND SURGERY     • LEG SURGERY     • LUMBAR LAMINECTOMY  12/07/2015    L4-5   • NEPHRECTOMY      Right kidney    • SIGMOIDOSCOPY  2/24/2023    Procedure: SIGMOIDOSCOPY FLEXIBLE;  Surgeon: Mandy Lacy MD;  Location: [facility] ENDOSCOPY;  Service: Gastroenterology;;  POOR PREP, HEMORRHOIDS   • VENTRAL HERNIA REPAIR N/A 6/15/2022    Procedure: VENTRAL Incisional HERNIA REPAIR LAPAROSCOPIC WITH DAVINCI ROBOT with mesh;  Surgeon: Tay Ash MD;  Location: [facility] MAIN OR;  Service: Robotics - DaVinci;  Laterality: N/A;       Family History:  Family History   Problem Relation Age of Onset   • Diabetes Paternal Grandmother    • Heart attack Paternal Grandmother    • Malig Hyperthermia Neg Hx        Social History:   reports that he has been smoking cigarettes. He has a 12.50 pack-year smoking history. He has never used smokeless tobacco. He reports current alcohol use. He reports that he does not use drugs.    Medications:   Medications Prior to Admission   Medication Sig Dispense Refill Last Dose   • amLODIPine (NORVASC) 5 MG tablet Take 1 tablet by mouth Daily. 30 tablet 0    • clobetasol (TEMOVATE) 0.05 % cream Apply  topically to the appropriate area as directed 2 (Two) Times a Day. 15 g 0    • levothyroxine (SYNTHROID, LEVOTHROID) 50 MCG tablet Take 1 tablet by mouth Daily.  "90 tablet 3    • polyethylene glycol (GoLYTELY) 236 g solution Take as directed 1000 mL 0        Allergies:  Patient has no known allergies.    ROS:    Pertinent items are noted in HPI     Objective     Blood pressure 144/95, pulse 67, temperature 97.3 °F (36.3 °C), temperature source Temporal, resp. rate 16, height 190.5 cm (75\"), weight 90 kg (198 lb 6.6 oz), SpO2 96 %.    Physical Exam   Constitutional: Pt is oriented to person, place, and time and well-developed, well-nourished, and in no distress.   Mouth/Throat: Oropharynx is clear and moist.   Neck: Normal range of motion.   Cardiovascular: Normal rate, regular rhythm and normal heart sounds.    Pulmonary/Chest: Effort normal and breath sounds normal.   Abdominal: Soft. Nontender  Skin: Skin is warm and dry.   Psychiatric: Mood, memory, affect and judgment normal.     Assessment & Plan     Diagnosis:  F/u of colitis    Anticipated Surgical Procedure:  Colonoscopy    The risks, benefits, and alternatives of this procedure have been discussed with the patient or the responsible party- the patient understands and agrees to proceed.          "

## 2023-05-25 NOTE — ANESTHESIA POSTPROCEDURE EVALUATION
Patient: Donte Stallings    Procedure Summary     Date: 05/25/23 Room / Location: [facility] ENDOSCOPY 4 / [facility]    Anesthesia Start: 0755 Anesthesia Stop: 0811    Procedure: COLONOSCOPY, WITH POLYPECTOMIES Diagnosis:       Abnormal CT scan      (Abnormal CT scan [R93.89])    Surgeons: aMndy Lacy MD Provider: Winston Lynn MD    Anesthesia Type: general ASA Status: 2          Anesthesia Type: general    Vitals  Vitals Value Taken Time   /140 05/25/23 0822   Temp 36 °C (96.8 °F) 05/25/23 0816   Pulse 68 05/25/23 0829   Resp 20 05/25/23 0816   SpO2 96 % 05/25/23 0829   Vitals shown include unvalidated device data.        Post Anesthesia Care and Evaluation    Patient location during evaluation: bedside  Patient participation: complete - patient participated  Level of consciousness: awake  Pain management: adequate    Airway patency: patent  PONV Status: none  Cardiovascular status: acceptable and stable  Respiratory status: acceptable  Hydration status: acceptable    Comments: An Anesthesiologist personally participated in the most demanding procedures (including induction and emergence if applicable) in the anesthesia plan, monitored the course of anesthesia administration at frequent intervals and remained physically present and available for immediate diagnosis and treatment of emergencies.

## 2023-05-26 LAB
CYTO UR: NORMAL
LAB AP CASE REPORT: NORMAL
LAB AP CLINICAL INFORMATION: NORMAL
PATH REPORT.FINAL DX SPEC: NORMAL
PATH REPORT.GROSS SPEC: NORMAL

## 2023-05-30 ENCOUNTER — TELEPHONE (OUTPATIENT)
Dept: GASTROENTEROLOGY | Facility: CLINIC | Age: 43
End: 2023-05-30

## 2023-05-30 NOTE — TELEPHONE ENCOUNTER
----- Message from Mandy Lacy MD sent at 5/29/2023  3:23 PM EDT -----  Recommend repeat colonoscopy in 5 years for colon polyp surveillance.  Please mail results to patient and PCP.  thanks

## 2023-06-19 DIAGNOSIS — I10 ESSENTIAL HYPERTENSION: ICD-10-CM

## 2023-06-19 RX ORDER — AMLODIPINE BESYLATE 5 MG/1
5 TABLET ORAL DAILY
Qty: 30 TABLET | Refills: 0 | Status: SHIPPED | OUTPATIENT
Start: 2023-06-19

## 2023-06-19 NOTE — TELEPHONE ENCOUNTER
Caller: DILCIA FRASER    Relationship: Emergency Contact    Best call back number: [phone]     Requested Prescriptions:   Requested Prescriptions     Pending Prescriptions Disp Refills    amLODIPine (NORVASC) 5 MG tablet 30 tablet 0     Sig: Take 1 tablet by mouth Daily.        Pharmacy where request should be sent: [id number] - [place] [address] AT SEC OF  & [facility] [phone] [facility] [phone] FX     Last office visit with prescribing clinician: 6/7/2022   Last telemedicine visit with prescribing clinician: Visit date not found   Next office visit with prescribing clinician: 6/29/2023     Additional details provided by patient: FOUR LEFT ON HAND     Does the patient have less than a 3 day supply:  [] Yes  [x] No    Would you like a call back once the refill request has been completed: [x] Yes [] No    If the office needs to give you a call back, can they leave a voicemail: [x] Yes [] No    Obed Burnham Rep   06/19/23 15:30 EDT

## 2023-08-03 DIAGNOSIS — E03.9 ACQUIRED HYPOTHYROIDISM: ICD-10-CM

## 2023-08-03 RX ORDER — LEVOTHYROXINE SODIUM 0.05 MG/1
50 TABLET ORAL DAILY
Qty: 90 TABLET | Refills: 3 | OUTPATIENT
Start: 2023-08-03

## 2023-08-22 DIAGNOSIS — I10 ESSENTIAL HYPERTENSION: ICD-10-CM

## 2023-08-22 RX ORDER — AMLODIPINE BESYLATE 5 MG/1
5 TABLET ORAL DAILY
Qty: 90 TABLET | Refills: 1 | Status: SHIPPED | OUTPATIENT
Start: 2023-08-22 | End: 2023-11-20

## 2024-06-20 ENCOUNTER — TELEPHONE (OUTPATIENT)
Dept: SURGERY | Facility: CLINIC | Age: 44
End: 2024-06-20
Payer: COMMERCIAL

## 2024-06-20 NOTE — TELEPHONE ENCOUNTER
MELANIE FROM [facility] CALLED IN REGARDS TO APPROVAL FOR PROCEDURE. SHE STATES THAT THEY SAID IT WILL NEED A PEER TO PEER BEFORE IT CAN BE APPROVED. THEY ARE WANTING TO KNOW HOW IT WILL CHANGE TREATMENT AND WHY IT IS NEEDED.     MELANIE STATES SHE SENT THEM MORE INFO AND THIS WAS STILL THE OUTCOME.     PHONE: [phone]  TRACKING #: [id number]     # [phone]

## 2024-07-17 ENCOUNTER — TELEPHONE (OUTPATIENT)
Dept: UROLOGY | Facility: CLINIC | Age: 44
End: 2024-07-17

## 2024-07-17 NOTE — TELEPHONE ENCOUNTER
CALLED PT TO OFFER R/S OF NO SHOWED APPT 7/17 W/ NOEMI    NUMBER OUT OF SERVICE. NO OTHER NUMBERS IN CHART.

## 2024-07-23 ENCOUNTER — TELEPHONE (OUTPATIENT)
Dept: UROLOGY | Facility: CLINIC | Age: 44
End: 2024-07-23
Payer: COMMERCIAL

## 2024-07-23 NOTE — TELEPHONE ENCOUNTER
Unable to contact patient as this is the only number in chart and when I called the message said the number was no longer in service.

## 2024-07-24 NOTE — TELEPHONE ENCOUNTER
2ND CALL - CALLED PT TO OFFER R/S OF NO SHOWED APPT 7/17 W/ NOEMI    NUMBER OUT OF SERVICE. NO OTHER NUMBERS IN CHART.

## 2024-07-26 NOTE — TELEPHONE ENCOUNTER
3RD CALL - CALLED PT TO OFFER R/S OF NO SHOWED APPT 7/17 W/ NOEMI    NUMBER OUT OF SERVICE. NO OTHER NUMBERS IN CHART.    THREE ATTEMPTS MADE TO OFFER R/S, ANYTHING ELSE TO DO?

## 2024-10-18 ENCOUNTER — OFFICE VISIT (OUTPATIENT)
Dept: UROLOGY | Facility: CLINIC | Age: 44
End: 2024-10-18
Payer: COMMERCIAL

## 2024-10-18 ENCOUNTER — TELEPHONE (OUTPATIENT)
Dept: UROLOGY | Facility: CLINIC | Age: 44
End: 2024-10-18

## 2024-10-18 VITALS
HEIGHT: 75 IN | DIASTOLIC BLOOD PRESSURE: 92 MMHG | WEIGHT: 199 LBS | BODY MASS INDEX: 24.74 KG/M2 | SYSTOLIC BLOOD PRESSURE: 136 MMHG

## 2024-10-18 DIAGNOSIS — C64.2 MALIGNANT NEOPLASM OF LEFT KIDNEY: Primary | ICD-10-CM

## 2024-10-18 LAB
BILIRUB BLD-MCNC: NEGATIVE MG/DL
CLARITY, POC: CLEAR
COLOR UR: YELLOW
EXPIRATION DATE: ABNORMAL
GLUCOSE UR STRIP-MCNC: ABNORMAL MG/DL
KETONES UR QL: NEGATIVE
LEUKOCYTE EST, POC: NEGATIVE
Lab: ABNORMAL
NITRITE UR-MCNC: NEGATIVE MG/ML
PH UR: 6 [PH] (ref 5–8)
PROT UR STRIP-MCNC: NEGATIVE MG/DL
RBC # UR STRIP: ABNORMAL /UL
SP GR UR: 1.01 (ref 1–1.03)
UROBILINOGEN UR QL: ABNORMAL

## 2024-10-18 NOTE — PROGRESS NOTES
"Chief Complaint  Malignant neoplasm of left kidney    Subjective          Donte Stallings presents to [facility]    History of Present Illness  Mr. Stallings is status post right radical nephrectomy for renal cell carcinoma in 2021.  Recent CT scan in June 2023 reveals no obvious signs of metastasis.  His left kidney is normal.  He does have a left adrenal nodule that is stable since prior to surgery.      Objective   Vital Signs:   /92   Ht 190.5 cm (75\")   Wt 90.3 kg (199 lb)   BMI 24.87 kg/m²       Physical Exam  Vitals and nursing note reviewed.   Constitutional:       Appearance: Normal appearance. He is well-developed.   Pulmonary:      Effort: Pulmonary effort is normal.      Breath sounds: Normal air entry.   Neurological:      Mental Status: He is alert and oriented to person, place, and time.      Motor: Motor function is intact.   Psychiatric:         Mood and Affect: Mood normal.         Behavior: Behavior normal.          Result Review :   The following data was reviewed by: Marli Hamilton MD on 10/18/2024:    Results for orders placed or performed in visit on 10/18/24   POC Urinalysis Dipstick, Automated    Collection Time: 10/18/24 10:11 AM    Specimen: Urine   Result Value Ref Range    Color Yellow Yellow, Straw, Dark Yellow, Reyna    Clarity, UA Clear Clear    Specific Gravity  1.010 1.005 - 1.030    pH, Urine 6.0 5.0 - 8.0    Leukocytes Negative Negative    Nitrite, UA Negative Negative    Protein, POC Negative Negative mg/dL    Glucose,  mg/dL (A) Negative mg/dL    Ketones, UA Negative Negative    Urobilinogen, UA 0.2 E.U./dL Normal, 0.2 E.U./dL    Bilirubin Negative Negative    Blood, UA Trace (A) Negative    Lot Number [id number]     Expiration Date 82,025        CT Abdomen Pelvis With Contrast  Order: [id number]  Impression    No acute abnormality.  No obvious hernia.    Electronically Signed:  Albert Contreras MD  2023/12/12 at 17:58 CST  Reading Location ID " and State: [address] / [place]  Tel [phone], Service support  [phone], Fax [phone]  Narrative      REPORT-ID:[id number]    STUDY:  CT ABDOMEN AND PELVIS WITHOUT CONTRAST    REASON FOR EXAM:   Male, 43 years old.  Incisional hernia without obstruction or gangrene    RADIATION DOSAGE (If Supplied By Facility):  CTDIvol = ( 7.6 ) mGy, DLP = ( 421.6 ) mGycm    TECHNIQUE:   Transaxial images were obtained from the dome of the diaphragm to the symphysis pubis with oral contrast, and without intravenous contrast.  Sagittal and coronal images were reconstructed.    Individualized dose optimization techniques were used for this CT.    COMPARISON:   None.  ___________________________________    FINDINGS:  The visualized lung bases are unremarkable.  The visualized portions of the heart are within normal limits.    Normal liver.  Normal gallbladder and extrahepatic biliary system.  Normal spleen.  Normal pancreas.    There is a small, circumscribed, smooth, low attenuation left adrenal mass, consistent with an adrenal adenoma.  Normal right adrenal gland.    Status post right nephrectomy.  Normal left kidney.    Normal visualized stomach.  Normal small intestine.  Normal colon.  The appendix is visualized and appears normal.    Normal abdominal aorta.  Normal inferior vena cava.  Normal retroperitoneum.    Normal urinary bladder.    Normal abdominal wall.  Mild dextroscoliosis of the lumbar spine.  ___________________________________  Exam End: 12/12/23 10:38 Last Resulted: 12/12/23 18:58   [facility]            Assessment and Plan    Diagnoses and all orders for this visit:    1. Malignant neoplasm of left kidney (Primary)  Overview:  Status post right radical nephrectomy in 2021.      Orders:  -     POC Urinalysis Dipstick, Automated  -     CT Abdomen Pelvis With & Without Contrast; Future  -     CT Abdomen Pelvis With & Without Contrast; Future    He will have a CT scan done  now and I will call him with those results.  I will then have another scan done in a year.  He is 3 years out from surgery only yearly scans until phase 5 years out.        Follow Up       No follow-ups on file.  Patient was given instructions and counseling regarding his condition or for health maintenance advice. Please see specific information pulled into the AVS if appropriate.

## 2024-10-18 NOTE — TELEPHONE ENCOUNTER
Spoke to patient and informed of CT scan at [place] on 10/25 at 9:15 AM. I informed that patient is not to eat or drink anything for 4 hours prior to the scan. Patient voiced understanding.

## 2024-10-25 ENCOUNTER — HOSPITAL ENCOUNTER (OUTPATIENT)
Dept: OTHER | Facility: HOSPITAL | Age: 44
Discharge: HOME | End: 2024-10-25

## 2024-10-28 ENCOUNTER — TELEPHONE (OUTPATIENT)
Dept: UROLOGY | Age: 44
End: 2024-10-28
Payer: COMMERCIAL

## 2024-10-28 NOTE — TELEPHONE ENCOUNTER
Spoke to patient and informed per Dr. Hamilton that there was no recurrence of kidney cancer according to CT and that scheduling would call in September of next year to schedule it. Patient voiced understanding.

## 2025-05-13 ENCOUNTER — TELEPHONE (OUTPATIENT)
Dept: UROLOGY | Age: 45
End: 2025-05-13
Payer: COMMERCIAL

## 2025-05-13 NOTE — TELEPHONE ENCOUNTER
Provider: DR FRANKLIN    Caller: Donte Stallings     Relationship to Patient: SELF    Phone Number: [phone]     Reason for Call: PT RECEIVED 2 RECALL LETTERS,     APPT HAS BEEN SCHEDULED FROM ONE LETTER.    PT TRANSFERRED TO CENTRAL SCHEDULING TO SCHEDULE CT

## 2025-06-11 ENCOUNTER — TELEPHONE (OUTPATIENT)
Dept: UROLOGY | Age: 45
End: 2025-06-11
Payer: COMMERCIAL

## 2025-06-11 DIAGNOSIS — C64.2 RENAL CELL CARCINOMA OF LEFT KIDNEY: Primary | ICD-10-CM

## 2025-06-11 NOTE — TELEPHONE ENCOUNTER
Spoke with patient informing him that Lizy CANADA has ordered a cat scan, blood work, urinalysis and urine culture. He will need to be at [facility] at 930 in the morning. He will need to go to the lab prior to getting the cat scan. Patient verbalized understanding. We will call him with the results.

## 2025-06-11 NOTE — TELEPHONE ENCOUNTER
Provider: DR RFANKLIN    Caller: Donte Stallings    Relationship to Patient: Self      Reason for Call: PT ATTEMPTED TO WALK [facility]    PT CT SCAN IS SCHEDULED 10/16/25 PRIOR TO HIS 1 YR FU APPT ON 11/3/25.    PT ATTEMPTED TO GET CT SCAN DONE EARLY BECAUSE HE FEELS HE IS HAVING ISSUES WITH HIS LEFT KIDNEY.    PLEASE CALL TO DISCUSS IF HIS CT SHOULD BE COMPLETED SOONER THAN CURRENTLY SCHEDULED.  ORDERS WILL NEED TO BE MODIFIED.    When was the patient last seen: 10/18/24

## 2025-06-12 ENCOUNTER — HOSPITAL ENCOUNTER (OUTPATIENT)
Dept: CT IMAGING | Facility: HOSPITAL | Age: 45
Discharge: HOME | End: 2025-06-12
Payer: COMMERCIAL

## 2025-06-12 ENCOUNTER — LAB (OUTPATIENT)
Dept: LAB | Facility: HOSPITAL | Age: 45
End: 2025-06-12
Payer: COMMERCIAL

## 2025-06-12 DIAGNOSIS — C64.2 RENAL CELL CARCINOMA OF LEFT KIDNEY: ICD-10-CM

## 2025-06-12 LAB
ALBUMIN SERPL-MCNC: 4.1 G/DL (ref 3.5–5.2)
ALBUMIN/GLOB SERPL: 1.3 G/DL
ALP SERPL-CCNC: 79 U/L (ref 39–117)
ALT SERPL W P-5'-P-CCNC: 11 U/L (ref 1–41)
ANION GAP SERPL CALCULATED.3IONS-SCNC: 11 MMOL/L (ref 5–15)
AST SERPL-CCNC: 19 U/L (ref 1–40)
BACTERIA UR QL AUTO: NORMAL /HPF
BASOPHILS # BLD AUTO: 0.11 10*3/MM3 (ref 0–0.2)
BASOPHILS NFR BLD AUTO: 0.9 % (ref 0–1.5)
BILIRUB SERPL-MCNC: 0.2 MG/DL (ref 0–1.2)
BILIRUB UR QL STRIP: NEGATIVE
BUN SERPL-MCNC: 13 MG/DL (ref 6–20)
BUN/CREAT SERPL: 9 (ref 7–25)
CALCIUM SPEC-SCNC: 9.2 MG/DL (ref 8.6–10.5)
CHLORIDE SERPL-SCNC: 106 MMOL/L (ref 98–107)
CLARITY UR: CLEAR
CO2 SERPL-SCNC: 24 MMOL/L (ref 22–29)
COLOR UR: YELLOW
CREAT SERPL-MCNC: 1.45 MG/DL (ref 0.76–1.27)
DEPRECATED RDW RBC AUTO: 41.9 FL (ref 37–54)
EGFRCR SERPLBLD CKD-EPI 2021: 60.6 ML/MIN/1.73
EOSINOPHIL # BLD AUTO: 0.34 10*3/MM3 (ref 0–0.4)
EOSINOPHIL NFR BLD AUTO: 2.7 % (ref 0.3–6.2)
ERYTHROCYTE [DISTWIDTH] IN BLOOD BY AUTOMATED COUNT: 12.2 % (ref 12.3–15.4)
GLOBULIN UR ELPH-MCNC: 3.1 GM/DL
GLUCOSE SERPL-MCNC: 90 MG/DL (ref 65–99)
GLUCOSE UR STRIP-MCNC: NEGATIVE MG/DL
HCT VFR BLD AUTO: 43 % (ref 37.5–51)
HGB BLD-MCNC: 14.3 G/DL (ref 13–17.7)
HGB UR QL STRIP.AUTO: NEGATIVE
HYALINE CASTS UR QL AUTO: NORMAL /LPF
IMM GRANULOCYTES # BLD AUTO: 0.07 10*3/MM3 (ref 0–0.05)
IMM GRANULOCYTES NFR BLD AUTO: 0.6 % (ref 0–0.5)
KETONES UR QL STRIP: NEGATIVE
LEUKOCYTE ESTERASE UR QL STRIP.AUTO: NEGATIVE
LYMPHOCYTES # BLD AUTO: 2.89 10*3/MM3 (ref 0.7–3.1)
LYMPHOCYTES NFR BLD AUTO: 22.8 % (ref 19.6–45.3)
MCH RBC QN AUTO: 31.4 PG (ref 26.6–33)
MCHC RBC AUTO-ENTMCNC: 33.3 G/DL (ref 31.5–35.7)
MCV RBC AUTO: 94.5 FL (ref 79–97)
MONOCYTES # BLD AUTO: 1.33 10*3/MM3 (ref 0.1–0.9)
MONOCYTES NFR BLD AUTO: 10.5 % (ref 5–12)
NEUTROPHILS NFR BLD AUTO: 62.5 % (ref 42.7–76)
NEUTROPHILS NFR BLD AUTO: 7.93 10*3/MM3 (ref 1.7–7)
NITRITE UR QL STRIP: NEGATIVE
NRBC BLD AUTO-RTO: 0 /100 WBC (ref 0–0.2)
PH UR STRIP.AUTO: 6 [PH] (ref 5–8)
PLATELET # BLD AUTO: 422 10*3/MM3 (ref 140–450)
PMV BLD AUTO: 8.8 FL (ref 6–12)
POTASSIUM SERPL-SCNC: 4.2 MMOL/L (ref 3.5–5.2)
PROT SERPL-MCNC: 7.2 G/DL (ref 6–8.5)
PROT UR QL STRIP: NEGATIVE
RBC # BLD AUTO: 4.55 10*6/MM3 (ref 4.14–5.8)
RBC # UR STRIP: NORMAL /HPF
REF LAB TEST METHOD: NORMAL
SODIUM SERPL-SCNC: 141 MMOL/L (ref 136–145)
SP GR UR STRIP: 1.02 (ref 1–1.03)
SQUAMOUS #/AREA URNS HPF: NORMAL /HPF
UROBILINOGEN UR QL STRIP: NORMAL
WBC # UR STRIP: NORMAL /HPF
WBC NRBC COR # BLD AUTO: 12.67 10*3/MM3 (ref 3.4–10.8)

## 2025-06-12 PROCEDURE — 36415 COLL VENOUS BLD VENIPUNCTURE: CPT

## 2025-06-12 PROCEDURE — 85025 COMPLETE CBC W/AUTO DIFF WBC: CPT

## 2025-06-12 PROCEDURE — 87086 URINE CULTURE/COLONY COUNT: CPT

## 2025-06-12 PROCEDURE — 80053 COMPREHEN METABOLIC PANEL: CPT

## 2025-06-12 PROCEDURE — 74176 CT ABD & PELVIS W/O CONTRAST: CPT

## 2025-06-12 PROCEDURE — 81001 URINALYSIS AUTO W/SCOPE: CPT

## 2025-06-13 LAB — BACTERIA SPEC AEROBE CULT: NO GROWTH

## (undated) DEVICE — SUT MNCRYL PLS ANTIB UD 4/0 PS2 18IN

## (undated) DEVICE — DAVINCI-LF: Brand: MEDLINE INDUSTRIES, INC.

## (undated) DEVICE — TIP COVER ACCESSORY

## (undated) DEVICE — SYR LUERLOK 30CC

## (undated) DEVICE — SOLIDIFIER LIQLOC PLS 1500CC BT

## (undated) DEVICE — LIGHT SLEEVE: Brand: DEVON

## (undated) DEVICE — Device: Brand: DEFENDO AIR/WATER/SUCTION AND BIOPSY VALVE

## (undated) DEVICE — CONN JET HYDRA H20 AUXILIARY DISP

## (undated) DEVICE — INTENDED FOR TISSUE SEPARATION, AND OTHER PROCEDURES THAT REQUIRE A SHARP SURGICAL BLADE TO PUNCTURE OR CUT.: Brand: BARD-PARKER ® CARBON RIB-BACK BLADES

## (undated) DEVICE — SUT VIC PLS CTD BR 0 TIE 18IN VIL

## (undated) DEVICE — PAD GRND REM POLYHESIVE A/ DISP

## (undated) DEVICE — LINER SURG CANSTR SXN S/RIGD 1500CC

## (undated) DEVICE — CANNULA SEAL

## (undated) DEVICE — 30977 SEE SHARP - ENHANCED INTRAOPERATIVE LAPAROSCOPE CLEANING & DEFOGGING: Brand: 30977 SEE SHARP - ENHANCED INTRAOPERATIVE LAPAROSCOPE CLEANING & DEFOGGING

## (undated) DEVICE — Device

## (undated) DEVICE — DRSNG WND GZ CURAD OIL EMULSION 3X3IN STRL

## (undated) DEVICE — 3M™ IOBAN™ 2 ANTIMICROBIAL INCISE DRAPE 6650EZ: Brand: IOBAN™ 2

## (undated) DEVICE — SLV SCD KN/LEN ADJ EXPRSS BLENDED MD 1P/U

## (undated) DEVICE — SOL IRR H2O BTL 1000ML STRL

## (undated) DEVICE — THE SINGLE USE ETRAP – POLYP TRAP IS USED FOR SUCTION RETRIEVAL OF ENDOSCOPICALLY REMOVED POLYPS.: Brand: ETRAP

## (undated) DEVICE — REDUCER: Brand: ENDOWRIST

## (undated) DEVICE — GLV SURG SENSICARE SLT PF LF 7.5 STRL

## (undated) DEVICE — ARM DRAPE

## (undated) DEVICE — SYS CLOSE PORTII CARTR/THOMASN XL

## (undated) DEVICE — STERILE POLYISOPRENE POWDER-FREE SURGICAL GLOVES: Brand: PROTEXIS

## (undated) DEVICE — SOL IRRG H2O PL/BG 1000ML STRL

## (undated) DEVICE — SNAR POLYP CAPTIFLEX XS/OVL 11X2.4MM 240CM 1P/U

## (undated) DEVICE — SEAL

## (undated) DEVICE — BLADELESS OBTURATOR: Brand: WECK VISTA

## (undated) DEVICE — APPL CHLORAPREP HI/LITE 26ML ORNG

## (undated) DEVICE — ABDOMINAL BINDER, ILIO BYPASS: Brand: DEROYAL

## (undated) DEVICE — PENCL E/S SMOKEEVAC W/TELESCP CANN

## (undated) DEVICE — GLV SURG SENSICARE PI LF PF 7.5 GRN STRL

## (undated) DEVICE — GLV SURG BIOGEL LTX PF 7 1/2

## (undated) DEVICE — ELECTRD BLD EDGE COAT 3IN